# Patient Record
Sex: MALE | Race: WHITE | NOT HISPANIC OR LATINO | Employment: UNEMPLOYED | URBAN - METROPOLITAN AREA
[De-identification: names, ages, dates, MRNs, and addresses within clinical notes are randomized per-mention and may not be internally consistent; named-entity substitution may affect disease eponyms.]

---

## 2019-11-05 ENCOUNTER — HOSPITAL ENCOUNTER (INPATIENT)
Facility: CLINIC | Age: 29
LOS: 10 days | Discharge: HOME | DRG: 557 | End: 2019-11-16
Attending: FAMILY MEDICINE | Admitting: INTERNAL MEDICINE

## 2019-11-05 ENCOUNTER — APPOINTMENT (OUTPATIENT)
Dept: GENERAL RADIOLOGY | Facility: CLINIC | Age: 29
DRG: 557 | End: 2019-11-05
Attending: FAMILY MEDICINE

## 2019-11-05 DIAGNOSIS — T79.6XXA TRAUMATIC RHABDOMYOLYSIS, INITIAL ENCOUNTER (H): Primary | ICD-10-CM

## 2019-11-05 DIAGNOSIS — N17.9 ACUTE RENAL FAILURE, UNSPECIFIED ACUTE RENAL FAILURE TYPE (H): ICD-10-CM

## 2019-11-05 DIAGNOSIS — F12.90 MARIJUANA SMOKER: ICD-10-CM

## 2019-11-05 DIAGNOSIS — F15.90 OTHER STIMULANT USE, UNSPECIFIED, UNCOMPLICATED: ICD-10-CM

## 2019-11-05 LAB
ANION GAP SERPL CALCULATED.3IONS-SCNC: 11 MMOL/L (ref 3–14)
BASOPHILS # BLD AUTO: 0 10E9/L (ref 0–0.2)
BASOPHILS NFR BLD AUTO: 0.1 %
BUN SERPL-MCNC: 67 MG/DL (ref 7–30)
CALCIUM SERPL-MCNC: 8.3 MG/DL (ref 8.5–10.1)
CHLORIDE SERPL-SCNC: 94 MMOL/L (ref 94–109)
CK SERPL-CCNC: ABNORMAL U/L (ref 30–300)
CO2 SERPL-SCNC: 24 MMOL/L (ref 20–32)
CREAT SERPL-MCNC: 8.38 MG/DL (ref 0.66–1.25)
DIFFERENTIAL METHOD BLD: ABNORMAL
EOSINOPHIL # BLD AUTO: 0 10E9/L (ref 0–0.7)
EOSINOPHIL NFR BLD AUTO: 0.4 %
ERYTHROCYTE [DISTWIDTH] IN BLOOD BY AUTOMATED COUNT: 12.1 % (ref 10–15)
GFR SERPL CREATININE-BSD FRML MDRD: 8 ML/MIN/{1.73_M2}
GLUCOSE SERPL-MCNC: 93 MG/DL (ref 70–99)
HCT VFR BLD AUTO: 43 % (ref 40–53)
HGB BLD-MCNC: 15.2 G/DL (ref 13.3–17.7)
IMM GRANULOCYTES # BLD: 0.1 10E9/L (ref 0–0.4)
IMM GRANULOCYTES NFR BLD: 0.6 %
LYMPHOCYTES # BLD AUTO: 1.1 10E9/L (ref 0.8–5.3)
LYMPHOCYTES NFR BLD AUTO: 9.7 %
MCH RBC QN AUTO: 30.5 PG (ref 26.5–33)
MCHC RBC AUTO-ENTMCNC: 35.3 G/DL (ref 31.5–36.5)
MCV RBC AUTO: 86 FL (ref 78–100)
MONOCYTES # BLD AUTO: 1.1 10E9/L (ref 0–1.3)
MONOCYTES NFR BLD AUTO: 9.8 %
NEUTROPHILS # BLD AUTO: 9 10E9/L (ref 1.6–8.3)
NEUTROPHILS NFR BLD AUTO: 79.4 %
NRBC # BLD AUTO: 0 10*3/UL
NRBC BLD AUTO-RTO: 0 /100
PLATELET # BLD AUTO: 182 10E9/L (ref 150–450)
POTASSIUM SERPL-SCNC: 4.3 MMOL/L (ref 3.4–5.3)
RBC # BLD AUTO: 4.99 10E12/L (ref 4.4–5.9)
SODIUM SERPL-SCNC: 129 MMOL/L (ref 133–144)
WBC # BLD AUTO: 11.3 10E9/L (ref 4–11)

## 2019-11-05 PROCEDURE — 96360 HYDRATION IV INFUSION INIT: CPT | Performed by: FAMILY MEDICINE

## 2019-11-05 PROCEDURE — 84100 ASSAY OF PHOSPHORUS: CPT | Performed by: FAMILY MEDICINE

## 2019-11-05 PROCEDURE — 81001 URINALYSIS AUTO W/SCOPE: CPT | Performed by: FAMILY MEDICINE

## 2019-11-05 PROCEDURE — 80048 BASIC METABOLIC PNL TOTAL CA: CPT | Performed by: FAMILY MEDICINE

## 2019-11-05 PROCEDURE — 82570 ASSAY OF URINE CREATININE: CPT | Performed by: FAMILY MEDICINE

## 2019-11-05 PROCEDURE — 80320 DRUG SCREEN QUANTALCOHOLS: CPT | Performed by: FAMILY MEDICINE

## 2019-11-05 PROCEDURE — 80307 DRUG TEST PRSMV CHEM ANLYZR: CPT | Performed by: FAMILY MEDICINE

## 2019-11-05 PROCEDURE — 80076 HEPATIC FUNCTION PANEL: CPT | Performed by: FAMILY MEDICINE

## 2019-11-05 PROCEDURE — 99285 EMERGENCY DEPT VISIT HI MDM: CPT | Mod: 25 | Performed by: FAMILY MEDICINE

## 2019-11-05 PROCEDURE — 25000132 ZZH RX MED GY IP 250 OP 250 PS 637: Performed by: FAMILY MEDICINE

## 2019-11-05 PROCEDURE — 73552 X-RAY EXAM OF FEMUR 2/>: CPT | Mod: LT

## 2019-11-05 PROCEDURE — 82550 ASSAY OF CK (CPK): CPT | Performed by: FAMILY MEDICINE

## 2019-11-05 PROCEDURE — 72170 X-RAY EXAM OF PELVIS: CPT

## 2019-11-05 PROCEDURE — 85025 COMPLETE CBC W/AUTO DIFF WBC: CPT | Performed by: FAMILY MEDICINE

## 2019-11-05 PROCEDURE — 99285 EMERGENCY DEPT VISIT HI MDM: CPT | Mod: Z6 | Performed by: FAMILY MEDICINE

## 2019-11-05 PROCEDURE — 87086 URINE CULTURE/COLONY COUNT: CPT | Performed by: FAMILY MEDICINE

## 2019-11-05 PROCEDURE — 84300 ASSAY OF URINE SODIUM: CPT | Performed by: FAMILY MEDICINE

## 2019-11-05 RX ORDER — ACETAMINOPHEN 325 MG/1
975 TABLET ORAL ONCE
Status: COMPLETED | OUTPATIENT
Start: 2019-11-05 | End: 2019-11-05

## 2019-11-05 RX ORDER — SODIUM CHLORIDE 9 MG/ML
INJECTION, SOLUTION INTRAVENOUS ONCE
Status: COMPLETED | OUTPATIENT
Start: 2019-11-05 | End: 2019-11-06

## 2019-11-05 RX ADMIN — ACETAMINOPHEN 975 MG: 325 TABLET, FILM COATED ORAL at 20:41

## 2019-11-06 ENCOUNTER — APPOINTMENT (OUTPATIENT)
Dept: ULTRASOUND IMAGING | Facility: CLINIC | Age: 29
DRG: 557 | End: 2019-11-06
Attending: PHYSICIAN ASSISTANT

## 2019-11-06 ENCOUNTER — APPOINTMENT (OUTPATIENT)
Dept: GENERAL RADIOLOGY | Facility: CLINIC | Age: 29
DRG: 557 | End: 2019-11-06
Attending: INTERNAL MEDICINE

## 2019-11-06 PROBLEM — M62.82 RHABDOMYOLYSIS: Status: ACTIVE | Noted: 2019-11-06

## 2019-11-06 LAB
ALBUMIN SERPL-MCNC: 2.6 G/DL (ref 3.4–5)
ALBUMIN SERPL-MCNC: 2.9 G/DL (ref 3.4–5)
ALBUMIN UR-MCNC: 100 MG/DL
ALP SERPL-CCNC: 76 U/L (ref 40–150)
ALP SERPL-CCNC: 89 U/L (ref 40–150)
ALT SERPL W P-5'-P-CCNC: 478 U/L (ref 0–70)
ALT SERPL W P-5'-P-CCNC: 570 U/L (ref 0–70)
AMORPH CRY #/AREA URNS HPF: ABNORMAL /HPF
AMPHETAMINES UR QL SCN: POSITIVE
ANION GAP SERPL CALCULATED.3IONS-SCNC: 11 MMOL/L (ref 3–14)
ANION GAP SERPL CALCULATED.3IONS-SCNC: 13 MMOL/L (ref 3–14)
APAP SERPL-MCNC: 3 MG/L (ref 10–20)
APPEARANCE UR: ABNORMAL
AST SERPL W P-5'-P-CCNC: 1225 U/L (ref 0–45)
AST SERPL W P-5'-P-CCNC: 891 U/L (ref 0–45)
BARBITURATES UR QL: NEGATIVE
BENZODIAZ UR QL: NEGATIVE
BILIRUB DIRECT SERPL-MCNC: 0.1 MG/DL (ref 0–0.2)
BILIRUB SERPL-MCNC: 0.4 MG/DL (ref 0.2–1.3)
BILIRUB SERPL-MCNC: 0.6 MG/DL (ref 0.2–1.3)
BILIRUB UR QL STRIP: NEGATIVE
BUN SERPL-MCNC: 68 MG/DL (ref 7–30)
BUN SERPL-MCNC: 71 MG/DL (ref 7–30)
CALCIUM SERPL-MCNC: 8.2 MG/DL (ref 8.5–10.1)
CALCIUM SERPL-MCNC: 8.3 MG/DL (ref 8.5–10.1)
CANNABINOIDS UR QL SCN: POSITIVE
CHLORIDE SERPL-SCNC: 95 MMOL/L (ref 94–109)
CHLORIDE SERPL-SCNC: 96 MMOL/L (ref 94–109)
CK SERPL-CCNC: ABNORMAL U/L (ref 30–300)
CO2 SERPL-SCNC: 18 MMOL/L (ref 20–32)
CO2 SERPL-SCNC: 20 MMOL/L (ref 20–32)
COCAINE UR QL: NEGATIVE
COLOR UR AUTO: YELLOW
CREAT SERPL-MCNC: 9.31 MG/DL (ref 0.66–1.25)
CREAT SERPL-MCNC: 9.85 MG/DL (ref 0.66–1.25)
CRP SERPL-MCNC: 64 MG/L (ref 0–8)
ERYTHROCYTE [DISTWIDTH] IN BLOOD BY AUTOMATED COUNT: 12 % (ref 10–15)
ERYTHROCYTE [SEDIMENTATION RATE] IN BLOOD BY WESTERGREN METHOD: 17 MM/H (ref 0–15)
ETHANOL UR QL SCN: NEGATIVE
GFR SERPL CREATININE-BSD FRML MDRD: 6 ML/MIN/{1.73_M2}
GFR SERPL CREATININE-BSD FRML MDRD: 7 ML/MIN/{1.73_M2}
GLUCOSE SERPL-MCNC: 83 MG/DL (ref 70–99)
GLUCOSE SERPL-MCNC: 89 MG/DL (ref 70–99)
GLUCOSE UR STRIP-MCNC: 30 MG/DL
HCT VFR BLD AUTO: 41.6 % (ref 40–53)
HGB BLD-MCNC: 14.6 G/DL (ref 13.3–17.7)
HGB BLD-MCNC: 14.9 G/DL (ref 13.3–17.7)
HGB UR QL STRIP: ABNORMAL
INR PPP: 0.95 (ref 0.86–1.14)
KETONES UR STRIP-MCNC: NEGATIVE MG/DL
LACTATE BLD-SCNC: 1.3 MMOL/L (ref 0.7–2)
LEUKOCYTE ESTERASE UR QL STRIP: ABNORMAL
MCH RBC QN AUTO: 30 PG (ref 26.5–33)
MCHC RBC AUTO-ENTMCNC: 35.8 G/DL (ref 31.5–36.5)
MCV RBC AUTO: 84 FL (ref 78–100)
NITRATE UR QL: NEGATIVE
OPIATES UR QL SCN: NEGATIVE
PH UR STRIP: 5.5 PH (ref 5–7)
PHOSPHATE SERPL-MCNC: 4.6 MG/DL (ref 2.5–4.5)
PLATELET # BLD AUTO: 188 10E9/L (ref 150–450)
POTASSIUM SERPL-SCNC: 4.5 MMOL/L (ref 3.4–5.3)
POTASSIUM SERPL-SCNC: 4.6 MMOL/L (ref 3.4–5.3)
PROT SERPL-MCNC: 6.1 G/DL (ref 6.8–8.8)
PROT SERPL-MCNC: 6.5 G/DL (ref 6.8–8.8)
RBC # BLD AUTO: 4.96 10E12/L (ref 4.4–5.9)
RBC #/AREA URNS AUTO: 4 /HPF (ref 0–2)
SODIUM SERPL-SCNC: 127 MMOL/L (ref 133–144)
SODIUM SERPL-SCNC: 127 MMOL/L (ref 133–144)
SOURCE: ABNORMAL
SP GR UR STRIP: 1.01 (ref 1–1.03)
SQUAMOUS #/AREA URNS AUTO: <1 /HPF (ref 0–1)
TRANS CELLS #/AREA URNS HPF: 1 /HPF (ref 0–1)
UROBILINOGEN UR STRIP-MCNC: NORMAL MG/DL (ref 0–2)
WBC # BLD AUTO: 12.8 10E9/L (ref 4–11)
WBC #/AREA URNS AUTO: 42 /HPF (ref 0–5)
WBC CLUMPS #/AREA URNS HPF: PRESENT /HPF

## 2019-11-06 PROCEDURE — 83605 ASSAY OF LACTIC ACID: CPT | Performed by: PEDIATRICS

## 2019-11-06 PROCEDURE — 86706 HEP B SURFACE ANTIBODY: CPT | Performed by: INTERNAL MEDICINE

## 2019-11-06 PROCEDURE — 85652 RBC SED RATE AUTOMATED: CPT | Performed by: PEDIATRICS

## 2019-11-06 PROCEDURE — 25800030 ZZH RX IP 258 OP 636: Performed by: STUDENT IN AN ORGANIZED HEALTH CARE EDUCATION/TRAINING PROGRAM

## 2019-11-06 PROCEDURE — 25800030 ZZH RX IP 258 OP 636: Performed by: FAMILY MEDICINE

## 2019-11-06 PROCEDURE — 76882 US LMTD JT/FCL EVL NVASC XTR: CPT | Mod: LT

## 2019-11-06 PROCEDURE — 87340 HEPATITIS B SURFACE AG IA: CPT | Performed by: INTERNAL MEDICINE

## 2019-11-06 PROCEDURE — 02HV33Z INSERTION OF INFUSION DEVICE INTO SUPERIOR VENA CAVA, PERCUTANEOUS APPROACH: ICD-10-PCS | Performed by: INTERNAL MEDICINE

## 2019-11-06 PROCEDURE — 25800030 ZZH RX IP 258 OP 636: Performed by: INTERNAL MEDICINE

## 2019-11-06 PROCEDURE — 36415 COLL VENOUS BLD VENIPUNCTURE: CPT | Performed by: INTERNAL MEDICINE

## 2019-11-06 PROCEDURE — 82550 ASSAY OF CK (CPK): CPT | Performed by: INTERNAL MEDICINE

## 2019-11-06 PROCEDURE — 40000986 XR CHEST PORT 1 VW

## 2019-11-06 PROCEDURE — 80329 ANALGESICS NON-OPIOID 1 OR 2: CPT | Performed by: PEDIATRICS

## 2019-11-06 PROCEDURE — 25000132 ZZH RX MED GY IP 250 OP 250 PS 637: Performed by: FAMILY MEDICINE

## 2019-11-06 PROCEDURE — 85027 COMPLETE CBC AUTOMATED: CPT | Performed by: PEDIATRICS

## 2019-11-06 PROCEDURE — 90937 HEMODIALYSIS REPEATED EVAL: CPT

## 2019-11-06 PROCEDURE — 80048 BASIC METABOLIC PNL TOTAL CA: CPT | Performed by: INTERNAL MEDICINE

## 2019-11-06 PROCEDURE — 25000128 H RX IP 250 OP 636: Performed by: STUDENT IN AN ORGANIZED HEALTH CARE EDUCATION/TRAINING PROGRAM

## 2019-11-06 PROCEDURE — 86140 C-REACTIVE PROTEIN: CPT | Performed by: PEDIATRICS

## 2019-11-06 PROCEDURE — 5A1D70Z PERFORMANCE OF URINARY FILTRATION, INTERMITTENT, LESS THAN 6 HOURS PER DAY: ICD-10-PCS | Performed by: INTERNAL MEDICINE

## 2019-11-06 PROCEDURE — 25000125 ZZHC RX 250: Performed by: STUDENT IN AN ORGANIZED HEALTH CARE EDUCATION/TRAINING PROGRAM

## 2019-11-06 PROCEDURE — 36415 COLL VENOUS BLD VENIPUNCTURE: CPT | Performed by: PEDIATRICS

## 2019-11-06 PROCEDURE — 12000001 ZZH R&B MED SURG/OB UMMC

## 2019-11-06 PROCEDURE — 85610 PROTHROMBIN TIME: CPT | Performed by: PEDIATRICS

## 2019-11-06 PROCEDURE — 99223 1ST HOSP IP/OBS HIGH 75: CPT | Mod: AI | Performed by: INTERNAL MEDICINE

## 2019-11-06 PROCEDURE — 93971 EXTREMITY STUDY: CPT | Mod: LT

## 2019-11-06 PROCEDURE — 25000132 ZZH RX MED GY IP 250 OP 250 PS 637: Performed by: EMERGENCY MEDICINE

## 2019-11-06 PROCEDURE — 80053 COMPREHEN METABOLIC PANEL: CPT | Performed by: PEDIATRICS

## 2019-11-06 PROCEDURE — 85018 HEMOGLOBIN: CPT | Performed by: INTERNAL MEDICINE

## 2019-11-06 RX ORDER — OXYCODONE HYDROCHLORIDE 5 MG/1
5 TABLET ORAL ONCE
Status: COMPLETED | OUTPATIENT
Start: 2019-11-06 | End: 2019-11-06

## 2019-11-06 RX ORDER — NALOXONE HYDROCHLORIDE 0.4 MG/ML
.1-.4 INJECTION, SOLUTION INTRAMUSCULAR; INTRAVENOUS; SUBCUTANEOUS
Status: DISCONTINUED | OUTPATIENT
Start: 2019-11-06 | End: 2019-11-16 | Stop reason: HOSPADM

## 2019-11-06 RX ORDER — SODIUM CHLORIDE, SODIUM LACTATE, POTASSIUM CHLORIDE, CALCIUM CHLORIDE 600; 310; 30; 20 MG/100ML; MG/100ML; MG/100ML; MG/100ML
INJECTION, SOLUTION INTRAVENOUS CONTINUOUS
Status: DISCONTINUED | OUTPATIENT
Start: 2019-11-06 | End: 2019-11-06

## 2019-11-06 RX ORDER — HYDROMORPHONE HYDROCHLORIDE 1 MG/ML
0.5 INJECTION, SOLUTION INTRAMUSCULAR; INTRAVENOUS; SUBCUTANEOUS EVERY 4 HOURS PRN
Status: DISCONTINUED | OUTPATIENT
Start: 2019-11-06 | End: 2019-11-10

## 2019-11-06 RX ORDER — ACETAMINOPHEN 500 MG
1000 TABLET ORAL ONCE
Status: DISCONTINUED | OUTPATIENT
Start: 2019-11-06 | End: 2019-11-06

## 2019-11-06 RX ORDER — SODIUM CHLORIDE, SODIUM LACTATE, POTASSIUM CHLORIDE, CALCIUM CHLORIDE 600; 310; 30; 20 MG/100ML; MG/100ML; MG/100ML; MG/100ML
INJECTION, SOLUTION INTRAVENOUS CONTINUOUS
Status: ACTIVE | OUTPATIENT
Start: 2019-11-06 | End: 2019-11-07

## 2019-11-06 RX ORDER — LIDOCAINE 40 MG/G
CREAM TOPICAL
Status: DISCONTINUED | OUTPATIENT
Start: 2019-11-06 | End: 2019-11-16 | Stop reason: HOSPADM

## 2019-11-06 RX ADMIN — NICOTINE POLACRILEX 4 MG: 4 GUM, CHEWING BUCCAL at 01:19

## 2019-11-06 RX ADMIN — SODIUM CHLORIDE: 9 INJECTION, SOLUTION INTRAVENOUS at 00:03

## 2019-11-06 RX ADMIN — SODIUM CHLORIDE, POTASSIUM CHLORIDE, SODIUM LACTATE AND CALCIUM CHLORIDE 1000 ML: 600; 310; 30; 20 INJECTION, SOLUTION INTRAVENOUS at 05:51

## 2019-11-06 RX ADMIN — OXYCODONE HYDROCHLORIDE 5 MG: 5 TABLET ORAL at 02:55

## 2019-11-06 RX ADMIN — HYDROMORPHONE HYDROCHLORIDE 0.5 MG: 1 INJECTION, SOLUTION INTRAMUSCULAR; INTRAVENOUS; SUBCUTANEOUS at 08:25

## 2019-11-06 RX ADMIN — SODIUM CHLORIDE, POTASSIUM CHLORIDE, SODIUM LACTATE AND CALCIUM CHLORIDE: 600; 310; 30; 20 INJECTION, SOLUTION INTRAVENOUS at 12:39

## 2019-11-06 RX ADMIN — SODIUM CHLORIDE 250 ML: 9 INJECTION, SOLUTION INTRAVENOUS at 19:51

## 2019-11-06 RX ADMIN — HYDROMORPHONE HYDROCHLORIDE 0.5 MG: 1 INJECTION, SOLUTION INTRAMUSCULAR; INTRAVENOUS; SUBCUTANEOUS at 14:16

## 2019-11-06 RX ADMIN — LIDOCAINE HYDROCHLORIDE 1 ML: 10 INJECTION, SOLUTION EPIDURAL; INFILTRATION; INTRACAUDAL; PERINEURAL at 17:22

## 2019-11-06 RX ADMIN — Medication: at 19:51

## 2019-11-06 RX ADMIN — SODIUM CHLORIDE 300 ML: 9 INJECTION, SOLUTION INTRAVENOUS at 19:50

## 2019-11-06 ASSESSMENT — ENCOUNTER SYMPTOMS
NUMBNESS: 1
NAUSEA: 0
VOMITING: 0
COUGH: 0
HEMATOLOGIC/LYMPHATIC NEGATIVE: 1
CHILLS: 0
FEVER: 0
SHORTNESS OF BREATH: 0
CONFUSION: 0
DYSURIA: 0
MYALGIAS: 0
PALPITATIONS: 0
AGITATION: 0

## 2019-11-06 ASSESSMENT — PAIN DESCRIPTION - DESCRIPTORS
DESCRIPTORS: INTERMITTENT;SORE
DESCRIPTORS: CONSTANT;SORE

## 2019-11-06 ASSESSMENT — MIFFLIN-ST. JEOR: SCORE: 2063.96

## 2019-11-06 ASSESSMENT — ACTIVITIES OF DAILY LIVING (ADL)
ADLS_ACUITY_SCORE: 13
ADLS_ACUITY_SCORE: 15

## 2019-11-06 NOTE — PLAN OF CARE
Patient A & O X 4, VSS no respiratory distress noted, c/o left hip and leg , received Dilaudid IV X 2 PRN. Post assessment with some relief/ patient report when he laying pain tolerable , with activity pain wars. Patient remain NPO , IVF infusing at 75cc/hr. Patient void x 2 report forget to save urine. Patient on strict I/O. Call light in reach , continue monitor closely and update MD with concerns.

## 2019-11-06 NOTE — CONSULTS
"[facility]  ORTHOPAEDIC SURGERY CONSULT - HISTORY AND PHYSICAL    DATE OF CONSULT: 11/6/2019   REQUESTING PROVIDER: Porfirio Morales MD, MD    TIME OF CONSULT: 0808  TIME OF PATIENT EVALUATION: 0830    CC: left buttock pain  DATE OF INJURY: 11/3/2019      HISTORY OF PRESENT ILLNESS:   Sawyer Walters is a 29 year old male with PMH including polysubstance abuse, admitted on 11/6/2019 who presented initially for ERROL likely due to rhabdomyolysis.  Orthopedics was consulted to evaluate the patient's complaint of left and thigh pain.  Patient reports that the pain began approximately 3 days ago on Sunday morning.  Prior to the start of his symptoms, the patient reports that he \"blacked out\" on his left side for approximately 12 hours after a night of drinking.  Initially, he reports the pain was excruciating, 10/10 throbbing pain of the left buttock that inhibited his ability to comfortably ambulate. Over the last 3 days, he reports a generalized improvement in his symptoms of pain, and he has been able to ambulate on the left lower extremity with a \"limp.\"  He denies experiencing similar symptoms in the past.  At baseline, the patient ambulates comfortably without the assistance of any devices.    Otherwise, the patient reports that he was feeling well prior to presentation in the emergency department.  Specifically, he reports the following:   General: denies fever, chills, sweats, fatigue, recent changes in weight or appetite  Integument: denies rashes, sores, lumps/bumps not otherwise noted above  Respiratory: denies cough, sputum production, hemoptysis, dyspnea, and wheezing  Cardiac: denies chest pain, SOB, palpitations  GI: denies abdominal pain, nausea, vomiting, change in bowel habits - diarrhea or constipation  Peripheral Vascular: denies history of DVT, peripheral edema  Musculoskeletal:  denies joint pain, swollen or stiff joints not otherwise noted above  Neurological: denies fainting, " focal weakness or paralysis, numbness/loss of sensation, tingling/altered sensation, tremors or other involuntary movements not otherwise noted above  Hematological: denies blood abnormalities other than those noted above    History obtained from patient interview and chart review. All relevant notes were reviewed and HPI/pertinent ROS were updated to reflect their current presentation and hospital course.       PAST MEDICAL HISTORY:   History reviewed. No pertinent past medical history.    Patient denies any personal history of bleeding disorders, clotting disorders, or adverse reactions to anesthesia.      PAST SURGICAL HISTORY:   No past surgical history on file.      MEDICATIONS:   Prior to Admission medications    Not on File         ALLERGIES:   Patient has no known allergies.      SOCIAL HISTORY:   Patient is a 29-year-old male, living situation unclear.  He presents to the hospital with his girlfriend.  He does have a significant history of polysubstance abuse, most recent UDS includes positive cannabinoids and amphetamine.  He endorses occasional tobacco use.  He drinks alcohol socially.      FAMILY HISTORY:  Patient denies known family history of bleeding, clotting, or anesthesia related complications.     REVIEW OF SYSTEMS:   A brief 10-point ROS was performed during the patient encounter. All pertinent items are included in the HPI. Other systems were negative, as below:    Head:  denies new headache, dizziness, light headedness  Eyes: denies new changes in vision, blurred vision, diplopia, excessive tearing  Ears: denies new hearing loss, pain  Nose: denies recent nasal congestion   Mouth: denies new oral sores, ulcers  Throat:  denies new pain, hoarseness, difficulty swallowing  Urinary: denies new onset dysuria, hematuria, polyuria, nocturia         PHYSICAL EXAM:   Vitals:    11/06/19 0300 11/06/19 0400 11/06/19 0449 11/06/19 1018   BP: 116/87 (!) 143/88 (!) 149/93 139/85   BP Location:    Left arm    Pulse: 73 71 72 60   Resp:   17 16   Temp:  97.9  F (36.6  C) 95.7  F (35.4  C) 96.9  F (36.1  C)   TempSrc:   Oral Oral   SpO2:   99% 99%   Weight:         General: Patient is somnolent at time of presentation, sleeping comfortably in bed.  He is easily aroused with verbal stimulation.  Answers questions appropriately, follows commands; overall, not acutely distressed except with palpation of left buttocks and thigh   Neuro: CN II-XII grossly intact  Skin: No rashes, lumps or bumps; skin color normal; multiple tattoos on neck and upper extremities bilaterally  HEENT:  Normocephalic, atraumatic; EOMs grossly intact; external ear without erythema or edema bilaterally; no septal deviation  Lungs: Breathing comfortably and nonlabored, no wheezes or stridor noted  Heart/Cardiovascular: Regular pulse, no peripheral cyanosis  Abdomen: Soft, non-tender, non-distended   Musculoskeletal:   Pelvis: Stable to AP and lateral compression  Upper Extremity:     No deformity, skin intact.     No significant tenderness to palpation over clavicle, AC joint, shoulder, arm, elbow, forearm, wrist.     Normal ROM of shoulder, elbow, and wrist, without pain    Motor intact distally throughout the radial, ulnar, and median nerve distributions as indicated by intact, 5/5 strength with thumbs up, finger crossing, and OK sign    Neurologic: SILT ax/m/r/u nerve distributions.     Vascular: Radial pulse palpable, 2+.   Right Lower Extremity:     No deformity, skin intact.     No significant tenderness to palpation over thigh, knee, leg, ankle/foot.     Normal ROM of hip, knee, and ankle, without tenderness    Motor intact distally throughout the tibial and peroneal nerve distributions as indicated by intact 5/5 strength with TA/GSC/EHL/FHL firing    SILT sp/dp/tibial/saph/sural nerves    DP/PT pulses palpable, 2+, toes warm and well perfused  Left Lower Extremity:     Palpable mass over the anterior thigh extending to the lateral buttock over  the greater trochanter area; the area is moderately soft, compressible, and elicits tenderness only with deep palpation; there is no significant here edema overlying the area of tenderness    Moderately tender to palpation over gluteal compartment; no significant tenderness to palpation over anterior, posterior, and adductor compartments or knee, leg, ankle/foot.     Normal ROM of hip (flexion/extension, abduction/adduction), knee, and ankle, with minimal tenderness    Motor intact distally throughout the tibial and peroneal nerve distributions as indicated by intact 5/5 strength with TA/GSC/EHL/FHL firing    SILT sp/dp/tibial/saph/sural nerves    DP/PT pulses palpable, 2+, toes warm and well perfused      LABS:  CBC  Hemoglobin   Date Value Ref Range Status   11/06/2019 14.9 13.3 - 17.7 g/dL Final     WBC   Date Value Ref Range Status   11/06/2019 12.8 (H) 4.0 - 11.0 10e9/L Final     Hematocrit   Date Value Ref Range Status   11/06/2019 41.6 40.0 - 53.0 % Final     Platelet Count   Date Value Ref Range Status   11/06/2019 188 150 - 450 10e9/L Final       CREATININE  Creatinine   Date Value Ref Range Status   11/06/2019 9.31 (H) 0.66 - 1.25 mg/dL Final       GLUCOSE  Glucose   Date Value Ref Range Status   11/06/2019 89 70 - 99 mg/dL Final       INR  INR   Date Value Ref Range Status   11/06/2019 0.95 0.86 - 1.14 Final       INFLAMMATORY LABS  WBC   Date Value Ref Range Status   11/06/2019 12.8 (H) 4.0 - 11.0 10e9/L Final     CRP Inflammation   Date Value Ref Range Status   11/06/2019 64.0 (H) 0.0 - 8.0 mg/L Final     Sed Rate   Date Value Ref Range Status   11/06/2019 17 (H) 0 - 15 mm/h Final         IMAGING:  AP lateral imaging of the left hip demonstrates no acute fracture, malalignment, or any other osseous abnormalities.       ASSESSMENT AND PLAN:    Sawyer Walters is a 29 year old male who presents with left buttock pain and anterior thigh pain consistent with hematoma versus myositis. Possible evolved  "compartment syndrome.    The patient presents with a history of 3 days of left buttock/anterior thigh pain that occurred after \"blacking out\" on the left side.  Patient reports that his pain was substantial on Sunday but has slightly improved since admission to the hospital.  In the context of an elevated CK 44,000 (now down to 18,000) his complaints are concerning for the possibility of an evolved compartment syndrome.  An acute/developing compartment syndrome is not supported by exam given that the patient is very comfortable and only responds to deep palpation of the left gluteal compartment.  Also, the compartments are moderately soft, not firm.  Although I do not recommend Orient compartment pressure monitoring at this time, it would be reasonable to perform serial checks over the next 12 hours to ensure that the patient's symptoms have not changed and he does not develop any other signs or symptoms consistent with compartment syndrome.  Furthermore, the patient should be kept n.p.o. until serial checks by orthopedics have been completed.    Given that the patient's CRP, sed rate, and white blood cell count are elevated, diagnosis of myositis may be considered. Also, given the mechanism of injury, his symptoms may be explained by development of a hematoma.    At this juncture, orthopedics will continue to monitor.  Recommend ultrasound to assess for DVT, and ultrasound imaging of the left gluteal region to assess for underlying mass (ie assessment of hematoma). No further imaging is indicated at this time.    The patient may participate in activities as tolerated.  He may participate in hip range of motion as tolerated, and he may weight-bear as tolerated.    Assessment and Plan discussed with Eder Frausto, PGY-4      Jamarcus Awad PA-C  Orthopaedic Surgery  Pager: [phone]     Thank you for allowing me to participate in this patient's care. Please page me at [phone] with any questions/concerns. If " there is no response, if it is a weekend, or if it is during evening hours, please page the orthopaedic surgery resident on call.

## 2019-11-06 NOTE — SUMMARY OF CARE
Pt arrived to unit around 0445 w/belongings including cell phone/ & clothing. Pt arrived with girlfriend @ bedside.

## 2019-11-06 NOTE — CONSULTS
[facility] PATIENT CONSULTATION NOTE    HPI:  Sawyer Walters is a 29 year old male with history of polysubstance abuse who presented to the ED on 11/5/2019 with complaints of left leg pain and right arm numbness.  Patient reports he got drunk at a hotel on Saturday (11/2/19) night and blacked out for 12 hours, sleeping on his side on the floor.  Patient reports difficultly walking due to his leg pain.  Patient currently reporting severe left hip pain which is similar in severity when he presented to the ER last night.  Denies any history of DVTs or clotting disorder.   Vascular surgery was consulted for left hip pain after being down with rhabdo, left hip swollen, tense and painful, concern for compartment syndrome.       PAST MEDICAL HISTORY:  History reviewed. No pertinent past medical history.    PAST SURGICAL HISTORY:  No past surgical history on file.    FAMILY HISTORY:  No family history on file.    SOCIAL HISTORY:   Social History     Tobacco Use     Smoking status: Not on file   Substance Use Topics     Alcohol use: Not on file       TOBACCO USE:  Current daily smoker     FUNCTIONAL CAPACITY: unemployed     HOME MEDICATIONS:  Prior to Admission medications    Not on File       VITAL SIGNS:  Temp: 95.7  F (35.4  C) Temp src: Oral BP: (!) 149/93 Pulse: 72   Resp: 17 SpO2: 99 % O2 Device: None (Room air)      215 lbs 0 oz    PHYSICAL EXAM:  NEURO/PSYCH:  The patient is alert and oriented.  Appropriate.  Moves all extremities.    SKIN:  Warm and dry.  PULMONARY: unlabored breathing, not requiring supplemental oxygen    EXTREMITIES: palpable bilateral DP and PT, palpable left femoral pulse, left lower extremity compartments soft, feet warm and well perfused, left gluteal region tender to touch, no lower extremity edema        BMP  Recent Labs   Lab 11/06/19  0751 11/05/19  2140   * 129*   POTASSIUM 4.5 4.3   CHLORIDE 95 94   CO2 20 24   BUN 71* 67*   CR 9.31* 8.38*   GLC 89 93   PHOS  --  4.6*      CBC  Recent Labs   Lab 11/06/19  0751 11/05/19  2140   WBC 12.8* 11.3*   HGB 14.9 15.2    182     INR/PTT  Recent Labs   Lab 11/06/19  0751   INR 0.95     ABGNo lab results found in last 7 days.  LFT  Recent Labs   Lab 11/06/19  0751 11/05/19  2140   * 1,225*   * 570*   ALKPHOS 76 89   BILITOTAL 0.6 0.4   ALBUMIN 2.6* 2.9*     PancreasNo lab results found in last 7 days.        ASSESSMENT:  29 year old man with a history of polysubstance abuse, admitted on 11/5/2019, who presents with ERROL likely due to rhabdomyolysis.       Patient Active Problem List   Diagnosis     Rhabdomyolysis           PLAN:  - consult orthopedics to evaluate left gluteal compartments to determine if patient requires fasciotomy   - no urgent vascular surgery intervention warranted at this time       Vascular surgery will sign off, please call with any questions.           Shayy TREVINO, CNS  Division of Vascular Surgery  [facility]    Pager [phone]  Office [phone]

## 2019-11-06 NOTE — H&P
"    [facility] [place]    History and Physical - Night Float Service        Date of Admission:  11/5/2019    Assessment & Plan   Sawyer Walters is a 29 year old man with a history of polysubstance abuse, admitted on 11/6/2019, who presents with ERROL likely due to rhabdomyolysis.    Acute Renal Failure   Transaminitis  Rhabdomyolysis  Hyponatremia  Hyperphosphatemia  Patient reports making yellow urine. BUN 67 and Cr 8.38. Na 129 (No prior sodiums available to compare). CK total - 59957, UA with moderate blood and negative RBC, WBC elevated, nitrites negative and crystals on microscopy suggesting AIN from rhabdomyolysis. No anion gap and bicarb 24. Given reported history from ED, rhabdomyolysis likely due to immobility, although physical trauma cannot be excluded. Other metabolic disturbances also may be contributing.  - 1L LR over 1 hour and continuous infusion at 200 mL/hr  - Tylenol for additional contributing metabolic disturbances  - INR to assess liver synthetic function  - Tylenol level  - CK total in AM  - CMP in AM  - Nephrology consulted    Mechanical Fall  Acute Left Hip Pain  Full range of active and passive motion. Hip/pelvis X-ray negative for fracture. Likely soft tissue injury  - Continue to monitor. Will avoid tylenol, NSAIDs, and opioids overnight.    Polysubstance Abuse  Patient reports \"blacking out\" and doesn't recall recent event. He does admit to drug use, but doesn't wish to elaborate. UDS positive for methamphetamines and cannaboids. Patient in pre-contemplative phase of cessation as he reports being embarrassed to talk about his substance abuse.       Diet: Regular adult diet  Fluids: 1L of LR with  mL/hr  DVT Prophylaxis: Low Risk/Ambulatory with no VTE prophylaxis indicated  Jaramilol Catheter: not present  Code Status: Full Code    Disposition Plan   Expected discharge: 2 - 3 days, recommended to prior living arrangement once renal function " "improved.  Entered: Won Jewell MD 11/06/2019, 5:14 AM       The patient's care was discussed with the Attending Physician, Dr. Loren Moseley MD.    Won Jewell MD  Night Float Service  [facility], [place]  Pager: [phone], See sticky note for AM  Please see sticky note for cross cover information  ___________________________________________________________________    Chief Complaint     History is obtained from the patient and chart review    History of Present Illness   Sawyer Walters is a 29 year old man with a history of polysubstance abuse who presents with ERROL likely due to rhabdomyolysis. Patient reports \"blacking out\" and not remembering recent events. He is able to say that he was at a hotel and that he had been drinking alcohol. He reports other drug use, but is not willing to disclose what substances. He complains of acute left hip pain and \"limping\". He reports that he is making urine and has peed three times today. He states his urine is yellow and denies hematuria.    He denies chest pain, SOB, nausea, vomiting, fever, abdominal pain, back pain.    Review of Systems    CONSTITUTIONAL: NEGATIVE for fever, chills  INTEGUMENTARY/SKIN: NEGATIVE for worrisome rashes, moles or lesions  ENT/MOUTH: NEGATIVE for ear, mouth and throat problems  RESP: NEGATIVE for significant cough or SOB  CV: NEGATIVE for chest pain or peripheral edema  GI: NEGATIVE for nausea, abdominal pain,or change in bowel habits  : NEGATIVE for hematuria, anuria  MUSCULOSKELETAL: POSITIVE for arthralgia and myalgia  PSYCH: POSITIVE for substance abuse.    Past Medical History    I have reviewed this patient's medical history and updated it with pertinent information if needed. Patient denies past medical disease.    Past Surgical History   Past surgical history reviewed with no previous surgeries identified.    Social History   Patient reports blacking out from acute alcohol " intoxication and reports other substance abuse which he wishes not to disclose    Family History   Family history reviewed with patient and is noncontributory.    Prior to Admission Medications   None     Allergies   No Known Allergies    Physical Exam   Vital Signs: Temp: 95.7  F (35.4  C) Temp src: Oral BP: (!) 149/93 Pulse: 72   Resp: 17 SpO2: 99 % O2 Device: None (Room air)    Weight: 215 lbs 0 oz    General Appearance: A&Ox4; comfortable in bed with significant other.  Eyes: No scleral icterus, EOMI  HEENT: No thrush, no cervical lymphadenopathy  Respiratory: CTAB bilaterally  Cardiovascular: S1 S2 RRR no m/g/r.   GI: Flat, +BS, NTTP  Lymph/Hematologic:   Genitourinary: No CVA tenderness. No asterixis  Skin: Tender, warm irregular erythematous lesion on left hip  Extremities: Pulses 2+ bilaterally, no peripheral edema. No tender  Neurologic: 5/5 muscle strength in the lower extremities. CN II- XII intact    Data   Data reviewed today: I reviewed all medications, new labs and imaging results over the last 24 hours. I personally reviewed the Left hip/pelvis image(s) showing no acute fracture.    Recent Labs   Lab 11/05/19  2140   WBC 11.3*   HGB 15.2   MCV 86      *   POTASSIUM 4.3   CHLORIDE 94   CO2 24   BUN 67*   CR 8.38*   ANIONGAP 11   JOSTIN 8.3*   GLC 93   ALBUMIN 2.9*   PROTTOTAL 6.5*   BILITOTAL 0.4   ALKPHOS 89   *   AST 1,225*     CK - 44,338  UA - moderate blood without RBC, crystals on microscopy    Physician Attestation   I, Roosevelt Sharma MD, saw this patient with the resident and agree with the resident/fellow's findings and plan of care as documented in the note.      I personally reviewed vital signs, medications, labs and imaging.      Roosevelt Sharma MD  Date of Service (when I saw the patient): 11/6/19

## 2019-11-06 NOTE — ED PROVIDER NOTES
Leaving shift. Report given to Dr Tolentino. 3 way conversation with int med and bed control well over 1 1/2 hours ago.     Uziel Otto MD  11/06/19 0156

## 2019-11-06 NOTE — ED NOTES
"[facility]   ED Nurse to Floor Handoff     Sawyer Walters is a 29 year old male who speaks English and lives with others,  home status is unknown  They arrived in the ED by walking from home    ED Chief Complaint: Leg Pain (per pt he passed out at home 3 days ago after drinking, fell down and since then has been having bilateral leg pain but more on the L side, per pt he still can't walk \" I feel a bump on my L upper thigh\" Is also having numbness and tingling on the R hand)    ED Dx;   Final diagnoses:   Acute renal failure, unspecified acute renal failure type (H)         Needed?: No    Allergies: No Known Allergies.  Past Medical Hx: History reviewed. No pertinent past medical history.   Baseline Mental status: WDL  Current Mental Status changes: at basesline    Infection present or suspected this encounter: no  Sepsis suspected: No  Isolation type: No active isolations     Activity level - Baseline/Home:  Independent  Activity Level - Current:   Independent    Bariatric equipment needed?: No    In the ED these meds were given:   Medications   sodium chloride 0.9% infusion (has no administration in time range)   acetaminophen (TYLENOL) tablet 975 mg (975 mg Oral Given 11/5/19 2041)       Drips running?  No    Home pump  No    Current LDAs  Peripheral IV 11/05/19 Right (Active)   Site Assessment Other (Comment) 11/6/2019 12:47 AM   Number of days: 1       Labs results:   Labs Ordered and Resulted from Time of ED Arrival Up to the Time of Departure from the ED   ROUTINE UA WITH MICROSCOPIC REFLEX TO CULTURE - Abnormal; Notable for the following components:       Result Value    Glucose Urine 30 (*)     Blood Urine Moderate (*)     Protein Albumin Urine 100 (*)     Leukocyte Esterase Urine Moderate (*)     WBC Urine 42 (*)     RBC Urine 4 (*)     WBC Clumps Present (*)     Amorphous Crystals Few (*)     All other components within normal limits   DRUG ABUSE SCREEN " 6 CHEM DEP URINE ([facility]) - Abnormal; Notable for the following components:    Amphetamine Qual Urine Positive (*)     Cannabinoids Qual Urine Positive (*)     All other components within normal limits   CBC WITH PLATELETS DIFFERENTIAL - Abnormal; Notable for the following components:    WBC 11.3 (*)     Absolute Neutrophil 9.0 (*)     All other components within normal limits   CK TOTAL - Abnormal; Notable for the following components:    CK Total 44,338 (*)     All other components within normal limits   BASIC METABOLIC PANEL - Abnormal; Notable for the following components:    Sodium 129 (*)     Urea Nitrogen 67 (*)     Creatinine 8.38 (*)     GFR Estimate 8 (*)     GFR Estimate If Black 9 (*)     Calcium 8.3 (*)     All other components within normal limits   HEPATIC PANEL - Abnormal; Notable for the following components:    Albumin 2.9 (*)     Protein Total 6.5 (*)      (*)     AST 1,225 (*)     All other components within normal limits   PHOSPHORUS - Abnormal; Notable for the following components:    Phosphorus 4.6 (*)     All other components within normal limits   URINE CULTURE AEROBIC BACTERIAL       Imaging Studies:   Recent Results (from the past 24 hour(s))   XR Pelvis 1/2 Views    Narrative    PELVIS AND LEFT FEMUR FIVE VIEWS   11/5/2019 10:29 PM     HISTORY: Fall 4 days ago while intoxicated. Pain and swelling over the  left lateral pelvis/upper leg. Painful to walk.    COMPARISON: None.      Impression    IMPRESSION: No visualized acute fracture, malalignment or other acute  osseous abnormality of the pelvis or left femur.    DOMENIC RADFORD MD   XR Femur Left 2 Views    Narrative    PELVIS AND LEFT FEMUR FIVE VIEWS   11/5/2019 10:29 PM     HISTORY: Fall 4 days ago while intoxicated. Pain and swelling over the  left lateral pelvis/upper leg. Painful to walk.    COMPARISON: None.      Impression    IMPRESSION: No visualized acute fracture, malalignment or other acute  osseous abnormality of the  pelvis or left femur.    DOMENIC RADFORD MD       Recent vital signs:   BP (!) 150/104   Pulse 73   Temp 96.4  F (35.8  C) (Oral)   Resp 16   Wt 97.5 kg (215 lb)   SpO2 100%     Wayne Coma Scale Score: 15 (11/05/19 2033)       Cardiac Rhythm: Tachycardia  Pt needs tele? No  Skin/wound Issues:R hip swelling    Code Status: Full Code    Pain control: fair    Nausea control: pt had none    Abnormal labs/tests/findings requiring intervention: Elevated CK, Elevated Creat, Elevated LFTs    Family present during ED course? No   Family Comments/Social Situation comments: PT GF visiting, then left    Tasks needing completion: None    JESSICA HERNANDEZ, RN  [facility] -- [phone] [place] [facility]  [phone] [facility] ED

## 2019-11-06 NOTE — PROGRESS NOTES
[facility] Internal Medicine Progress  Note            Interval History:   ESTRELLITA, team notes reviewed   Slept well   Left hip very painful to light palpation   Appreciative of cares          Assessment and Plan:   28 y/o M w/ERROL/AIN d/t rhabdomyolysis following 'black-out' d/t polysubstance abuse w/new left hip pain which started Sunday morning    # RHABDOMYOLYSIS w/AIN   # HYPONATREMIA   # UREMIA   # HYPERPHOSPHATEMIA   # ANEPHRIC ERROL/RENAL FAILURE   # TRANSAMINITIS   CK 44K w/anephric creatinine rise concerning for AIN induced renal failure. Per nephrology, gentle fluids w/caution for volume overload. Will continue to assess need for iHD. LFTs improving.   - Nephrology following, evaluating for iHD needs   - repeat CK x1   - monitor BMP/lytes   - LR@75cc/hr   - Strict I/O      # MECHANICAL FALL   # ACUTE LEFT HIP PAIN   No fx on imaging. Concern for compartment syndrome. Evaluated by vascular and orthopedic surgery.   - Dilauded 0.5mg q4hrs PRN      # LEUKOCYTOSIS   No evidence of infection. Likely reactive   - CTM      # POLYSUBSTANCE ABUSE   Tox screen + Amphetamines, Cannabinoids. Patient refusing to talk about it.   - consider CD consult tomorrow if pt willing       Diet: Regular   Fluids: LR@ 75cc/hr  DVT: SCDs, caution w/anticoagulation given uremia/possibe hip hematoma   Code Status: Full      This patient was staffed with Dr. Sharma, the attending physician on service.     Aldair Solano IV, MD, MSc  Internal Medicine Resident, PGY3  [facility]   Pager [phone]            Objective:   /85 (BP Location: Left arm)   Pulse 60   Temp 96.9  F (36.1  C) (Oral)   Resp 16   Wt 97.5 kg (215 lb)   SpO2 99%     No intake or output data in the 24 hours ending 11/06/19 1314    PHYSICAL EXAMINATION   GEN: A&Ox3, in pain w/movement, pleasant, cooperative   CV: RRR - no m/r/g  LUNGS: CTAB, nwb  ABD: +BS, soft, NT/ND  EXT: wwp, no edema, left hip tense/painful, no sxs of hematoma   NEURO: no  FNDs      Labs, Imaging, & Medications:   All labs, images, and medications reviewed by me.

## 2019-11-06 NOTE — ED PROVIDER NOTES
"    [facility] EMERGENCY DEPARTMENT ([facility])     November 5, 2019    History     Chief Complaint   Patient presents with     Leg Pain     per pt he passed out at home 3 days ago after drinking, fell down and since then has been having bilateral leg pain but more on the L side, per pt he still can't walk \" I feel a bump on my L upper thigh\" Is also having numbness and tingling on the R hand     HPI  Sawyer Walters is a 29 year old male who presents to the ED for evaluation of L leg pain and right arm numbness. Patient reports he got drunk at a hotel on Saturday (11/2/19) night, blacked out, and ended up on the floor. He is unsure if he fell from standing or how he got onto the floor. He states he slept on his right side for 12 hours with his right arm folded underneath his body, and his right hand has been numb since waking up after that incident. Patient reports he spent most of the day in bed yesterday. He states he tried to walk yesterday, however, he has been unable to walk, because his \"legs don't work.\" He complains of a \"bump\" on his left upper thigh. Patient reports he does not usually drink a lot. He states he is otherwise healthy. He notes he has been unemployed for \"awhile.\"  He denies street drugs.    PAST MEDICAL HISTORY  History reviewed. No pertinent past medical history.  PAST SURGICAL HISTORY  No past surgical history on file.  FAMILY HISTORY  No family history on file.  SOCIAL HISTORY  Social History     Tobacco Use     Smoking status: Not on file   Substance Use Topics     Alcohol use: Not on file     MEDICATIONS  Current Facility-Administered Medications   Medication     sodium chloride 0.9% infusion     No current outpatient medications on file.     ALLERGIES  No Known Allergies    I have reviewed the Medications, Allergies, Past Medical and Surgical History, and Social History in the Epic system.    Review of Systems   Constitutional: Negative for chills and fever.   HENT: Negative " "for congestion.    Respiratory: Negative for cough and shortness of breath.    Cardiovascular: Negative for chest pain, palpitations and leg swelling.   Gastrointestinal: Negative for nausea and vomiting.   Genitourinary: Negative for dysuria.   Musculoskeletal: Negative for myalgias.        Positive for leg pain.   Skin: Negative.         Positive for \"bump\" on left upper thigh.   Allergic/Immunologic: Negative for immunocompromised state.   Neurological: Positive for numbness (glove distribution to the right hand).   Hematological: Negative.    Psychiatric/Behavioral: Negative for agitation and confusion.   All other systems reviewed and are negative.      Physical Exam   BP: (!) 144/86  Pulse: 80  Temp: 96.4  F (35.8  C)  Resp: 16  Weight: 97.5 kg (215 lb)  SpO2: 99 %      Physical Exam  Vitals signs and nursing note reviewed.   Constitutional:       General: He is not in acute distress.     Appearance: Normal appearance. He is normal weight. He is not ill-appearing, toxic-appearing or diaphoretic.   HENT:      Head: Normocephalic and atraumatic.   Neck:      Musculoskeletal: Normal range of motion and neck supple.   Cardiovascular:      Rate and Rhythm: Normal rate and regular rhythm.      Pulses: Normal pulses.      Heart sounds: Normal heart sounds.   Pulmonary:      Effort: Pulmonary effort is normal.      Breath sounds: Normal breath sounds.   Abdominal:      Palpations: Abdomen is soft. There is no mass.   Musculoskeletal:      Comments: There is a firm area over the left lateral pelvis and upper leg  The area is tender to the touch- no real bruising  CMS to the left foot is intact   Neurological:      General: No focal deficit present.      Mental Status: He is alert and oriented to person, place, and time.      Comments: Exam of the right hand shows no edema  Sensation is present  Strength to fingers and wrist and arm is equal and symetrical         ED Course        Procedures        xrays show no acute " fractures- indeed the pt is able to walk and stand.  The pt's labs show renal failure with no acidosis and normal K.  The CK is 44,000!  The pt reports that he is making urine. In the ed, he did make a 70 ml yellow urine that was sent for ua and tox screen  The ua had white cells- positive blood dip with 4 white cells- culture sent.    Also the pt has marked elevation of ast and alt. ? an acute alcohol related hepatic injury    Urine tox is positive for amphetamine- pt admits to using meth on occasions and marijuana- he reports only occasional alcohol- if hx if correct- unlikley that he will have etoh withdrawal  Labs Ordered and Resulted from Time of ED Arrival Up to the Time of Departure from the ED   ROUTINE UA WITH MICROSCOPIC REFLEX TO CULTURE - Abnormal; Notable for the following components:       Result Value    Glucose Urine 30 (*)     Blood Urine Moderate (*)     Protein Albumin Urine 100 (*)     Leukocyte Esterase Urine Moderate (*)     WBC Urine 42 (*)     RBC Urine 4 (*)     WBC Clumps Present (*)     Amorphous Crystals Few (*)     All other components within normal limits   DRUG ABUSE SCREEN 6 CHEM DEP URINE ([facility]) - Abnormal; Notable for the following components:    Amphetamine Qual Urine Positive (*)     Cannabinoids Qual Urine Positive (*)     All other components within normal limits   CBC WITH PLATELETS DIFFERENTIAL - Abnormal; Notable for the following components:    WBC 11.3 (*)     Absolute Neutrophil 9.0 (*)     All other components within normal limits   CK TOTAL - Abnormal; Notable for the following components:    CK Total 44,338 (*)     All other components within normal limits   BASIC METABOLIC PANEL - Abnormal; Notable for the following components:    Sodium 129 (*)     Urea Nitrogen 67 (*)     Creatinine 8.38 (*)     GFR Estimate 8 (*)     GFR Estimate If Black 9 (*)     Calcium 8.3 (*)     All other components within normal limits   HEPATIC PANEL - Abnormal; Notable for the following  components:    Albumin 2.9 (*)     Protein Total 6.5 (*)      (*)     AST 1,225 (*)     All other components within normal limits   PHOSPHORUS - Abnormal; Notable for the following components:    Phosphorus 4.6 (*)     All other components within normal limits   URINE CULTURE AEROBIC BACTERIAL            Assessments & Plan (with Medical Decision Making)   Likely polydrug abuse with renal failure due to rhabdo. I spoke with renal. The pt needs to be watched for fluid overload. Need to observe urine output given the creatinine. Will give one liter of normal saline- advise from renal.  Report given to internal med at [facility]    AT 1230 AM THE PT RIPPED OUT HIS IV AND WAS IN THE PROCESS OF LEAVING EVEN THOUGH I TOLD HIM THE SERIOUSNESS OF HIS PROBLEM. HE ONLY AGREED TO STAY AND HAVE THE IV RESTARTED AFTER I BLUNTLY STATED THE LEAVING COULD EASILY RESULT IN HIS DEATH. THE IV WILL NEED TO BE RESTARTED AND THE FLUIDS.     I have reviewed the nursing notes.    I have reviewed the findings, diagnosis, plan and need for follow up with the patient.    New Prescriptions    No medications on file       Final diagnoses:   Acute renal failure, unspecified acute renal failure type (H)     IVijaya, am serving as a trained medical scribe to document services personally performed by Uziel Otto MD, based on the provider's statements to me.      zUiel GAMBLE MD, was physically present and have reviewed and verified the accuracy of this note documented by Vijaya Mitchell.     11/5/2019   [facility], EMERGENCY DEPARTMENT     Uziel Otto MD  11/06/19 0110

## 2019-11-06 NOTE — ED NOTES
PT notes he will now stay and has put his gown back on. PT asking to go smoke and notified he can have a nicotine patch or gum. Fam Lee notified and will order.

## 2019-11-06 NOTE — PROGRESS NOTES
Brief Nephrology Note    Received page earlier in evening regarding this patient presenting with CK of 44k and creatinine of 8.4, only mildly hypertensive, saturating well on room air, and K in 4s. Unknown baseline creatinine, but likely ERROL given rhabdo picture in young patient who was down and delayed presentation.     Given degree of renal failure, would be cautious with IVFs. Typical goal 200ml/hr UOP (and 200ml/hr IVF in) is intended to prevent ERROL, which has already occurred here. The patient is at risk for volume overload with renal injury having already occurred. Discussed 1L IVF with ED provider overnight. Would repeat labs and if creatinine not improved after this initial, modest attempt at IVFs would hold off further IVFs until patient seen by renal consult dayteam as patient would then likely not have treatable rhabdo / preventable ATN.     Unclear day team to call given handoff time about 15 minutes ago and sticky note not yet updated.     Steve Pedro MD  Renal Fellow

## 2019-11-06 NOTE — PROGRESS NOTES
Orthopedic Surgery Progress Note   November 6, 2019    Subjective: Patient was examined at 1300 to determine whether symptoms are changing given concern for compartment syndrome. Patient was sleeping at time of presentation, awoke to answer questions. He endorses continued left buttock pain, maybe some improvement this afternoon, now 5/10, exacerbated with palpation of the area       Objective: BP (!) 150/87 (BP Location: Left arm)   Pulse 68   Temp 95.7  F (35.4  C) (Oral)   Resp 16   Wt 97.5 kg (215 lb)   SpO2 97%     General: NAD, complaint of tenderness with palpation of the left buttock; alert and oriented, cooperative with exam.   Cardio: RRR, extremities wwp.   Respiratory: Non-labored breathing.  MSK: Focused examination of the left LE: Toes wwp, DP 2+, bcr in all toes. Compartments soft in lower leg, tolerates passive stretch of toes. Tenderness to palpation of the left gluteal region, anterior thigh; tolerates passive flexion/extension/abduction/adduction of hip; EHL/FHL/GSC/TA with 5/5 strength. SILT SP/DP/Sa/[place]/T.     Imaging: US demonstrates no focal mass or drainable fluid collection    Assessment and Plan: Sawyer Walters is a 29 year old male with left buttcock/anterior thigh pain. Exam is largely unchanged, may even tolerate more palpation without exquisite tenderness when compared to exam earlier today.    The patient presents with left buttock/anterior thigh pain. Recent US does not demonstrate evidence of hematoma formation. Given that exam is largely unchanged, less suspicion for compartment syndrome, although continue serial exams and NPO status until further notice. May consider MR left pelvis, thigh if symptoms do not resolve.     Plan per previous note today.    Jamarcus Awad PA-C  Orthopaedic Surgery  Pager: [phone]

## 2019-11-06 NOTE — PLAN OF CARE
"Formal consult note to follow.  Briefly, patient is a 29-year-old male with complaint of left gluteal pain.  His pain began on Sunday morning following a period of 12 hours in which he \"passed out \"on his left side.  He reports that the pain has been constant since Ronnie morning.  He has been ambulatory since the onset of his pain.    On presentation, the patient was comfortably sleeping in bed.  He was lying on the left side.  When aroused from sleep, the patient demonstrated mild tenderness with flexion, internal rotation, and external rotation of the hip.  He is tender to palpation over the left gluteal region.  The gluteal compartment was firm but somewhat compressible.    AP/lateral of the left hip does not demonstrate evidence of fracture    The case was discussed with Eder Frausto, PGY 4.  We will obtain an ultrasound of the left gluteal region to assess for hematoma versus fluid collection.  We will also obtain ultrasound of the left lower extremity to assess for a DVT.     Based on my clinical exam, this patient's history and physical exam are not strongly indicative of an acute compartment syndrome.  However, given that his CK is elevated and he is tender to palpation, we will certainly have a low threshold for serial monitoring and, if indicated, [place] compartment pressure testing.      Jamarcus Awad PA-C 11/6/2019 9:59 AM  Orthopaedic Surgery    Please page me at [phone] with any questions/concerns. If there is no response, if it is a weekend, or if it is during evening hours, please page the orthopaedic surgery resident on call.      "

## 2019-11-06 NOTE — ED NOTES
Pt pulled out his IV and yelling in room that he wants to leave. Dr. Otto notified and spoke with pt. Pt educated about high risk of death if not treated. Encouraged pt strongly and repeatedly to stay. PT still c/o wanting to leave. Pt in room on phone currently.

## 2019-11-06 NOTE — PLAN OF CARE
"Pt admitted to unit around 0445 this morning. VSS on RA except hypertensive. Pt c/o L hip pain towards end of shift. Up ad chiara. RPIV, S.L. Received LR bolus overnight. No BM this shift, able to void spontaneously. Pt w/girlfriend @ bedside. Skin WDL except dryness & cracks on heels & some localized redness near L hip. Pt states \"that is where it hurts\". Denies nausea. Able to use call light appropriately & make needs known. Continue to monitor & follow POC.   "

## 2019-11-07 ENCOUNTER — APPOINTMENT (OUTPATIENT)
Dept: ULTRASOUND IMAGING | Facility: CLINIC | Age: 29
DRG: 557 | End: 2019-11-07

## 2019-11-07 LAB
ALBUMIN SERPL-MCNC: 2.6 G/DL (ref 3.4–5)
ALP SERPL-CCNC: 79 U/L (ref 40–150)
ALT SERPL W P-5'-P-CCNC: 422 U/L (ref 0–70)
ANION GAP SERPL CALCULATED.3IONS-SCNC: 9 MMOL/L (ref 3–14)
AST SERPL W P-5'-P-CCNC: 616 U/L (ref 0–45)
BACTERIA SPEC CULT: NO GROWTH
BILIRUB SERPL-MCNC: 0.5 MG/DL (ref 0.2–1.3)
BUN SERPL-MCNC: 64 MG/DL (ref 7–30)
CALCIUM SERPL-MCNC: 8.2 MG/DL (ref 8.5–10.1)
CHLORIDE SERPL-SCNC: 97 MMOL/L (ref 94–109)
CK SERPL-CCNC: ABNORMAL U/L (ref 30–300)
CO2 SERPL-SCNC: 24 MMOL/L (ref 20–32)
CREAT SERPL-MCNC: 9.33 MG/DL (ref 0.66–1.25)
CREAT UR-MCNC: 88 MG/DL
ERYTHROCYTE [DISTWIDTH] IN BLOOD BY AUTOMATED COUNT: 12.2 % (ref 10–15)
FRACT EXCRET NA UR+SERPL-RTO: 2.8 %
GFR SERPL CREATININE-BSD FRML MDRD: 7 ML/MIN/{1.73_M2}
GLUCOSE SERPL-MCNC: 106 MG/DL (ref 70–99)
HBV SURFACE AB SERPL IA-ACNC: 61.02 M[IU]/ML
HBV SURFACE AG SERPL QL IA: NONREACTIVE
HCT VFR BLD AUTO: 43.4 % (ref 40–53)
HGB BLD-MCNC: 14.8 G/DL (ref 13.3–17.7)
Lab: NORMAL
MCH RBC QN AUTO: 29.4 PG (ref 26.5–33)
MCHC RBC AUTO-ENTMCNC: 34.1 G/DL (ref 31.5–36.5)
MCV RBC AUTO: 86 FL (ref 78–100)
PHOSPHATE SERPL-MCNC: 5.7 MG/DL (ref 2.5–4.5)
PLATELET # BLD AUTO: 209 10E9/L (ref 150–450)
POTASSIUM SERPL-SCNC: 4.4 MMOL/L (ref 3.4–5.3)
PROT SERPL-MCNC: 6.4 G/DL (ref 6.8–8.8)
RBC # BLD AUTO: 5.03 10E12/L (ref 4.4–5.9)
SODIUM SERPL-SCNC: 130 MMOL/L (ref 133–144)
SODIUM UR-SCNC: 38 MMOL/L
SPECIMEN SOURCE: NORMAL
WBC # BLD AUTO: 10.4 10E9/L (ref 4–11)

## 2019-11-07 PROCEDURE — 87516 HEPATITIS B DNA AMP PROBE: CPT | Performed by: STUDENT IN AN ORGANIZED HEALTH CARE EDUCATION/TRAINING PROGRAM

## 2019-11-07 PROCEDURE — 87535 HIV-1 PROBE&REVERSE TRNSCRPJ: CPT | Performed by: STUDENT IN AN ORGANIZED HEALTH CARE EDUCATION/TRAINING PROGRAM

## 2019-11-07 PROCEDURE — 99233 SBSQ HOSP IP/OBS HIGH 50: CPT | Mod: GC | Performed by: STUDENT IN AN ORGANIZED HEALTH CARE EDUCATION/TRAINING PROGRAM

## 2019-11-07 PROCEDURE — 40000556 ZZH STATISTIC PERIPHERAL IV START W US GUIDANCE

## 2019-11-07 PROCEDURE — 87521 HEPATITIS C PROBE&RVRS TRNSC: CPT | Performed by: STUDENT IN AN ORGANIZED HEALTH CARE EDUCATION/TRAINING PROGRAM

## 2019-11-07 PROCEDURE — 84100 ASSAY OF PHOSPHORUS: CPT | Performed by: STUDENT IN AN ORGANIZED HEALTH CARE EDUCATION/TRAINING PROGRAM

## 2019-11-07 PROCEDURE — 90937 HEMODIALYSIS REPEATED EVAL: CPT

## 2019-11-07 PROCEDURE — 85027 COMPLETE CBC AUTOMATED: CPT | Performed by: STUDENT IN AN ORGANIZED HEALTH CARE EDUCATION/TRAINING PROGRAM

## 2019-11-07 PROCEDURE — 25000128 H RX IP 250 OP 636: Performed by: STUDENT IN AN ORGANIZED HEALTH CARE EDUCATION/TRAINING PROGRAM

## 2019-11-07 PROCEDURE — 12000001 ZZH R&B MED SURG/OB UMMC

## 2019-11-07 PROCEDURE — 82550 ASSAY OF CK (CPK): CPT | Performed by: STUDENT IN AN ORGANIZED HEALTH CARE EDUCATION/TRAINING PROGRAM

## 2019-11-07 PROCEDURE — 80053 COMPREHEN METABOLIC PANEL: CPT | Performed by: STUDENT IN AN ORGANIZED HEALTH CARE EDUCATION/TRAINING PROGRAM

## 2019-11-07 PROCEDURE — 76770 US EXAM ABDO BACK WALL COMP: CPT

## 2019-11-07 PROCEDURE — 36415 COLL VENOUS BLD VENIPUNCTURE: CPT | Performed by: STUDENT IN AN ORGANIZED HEALTH CARE EDUCATION/TRAINING PROGRAM

## 2019-11-07 PROCEDURE — 25800030 ZZH RX IP 258 OP 636: Performed by: INTERNAL MEDICINE

## 2019-11-07 RX ORDER — AMLODIPINE BESYLATE 5 MG/1
5 TABLET ORAL DAILY
Status: DISCONTINUED | OUTPATIENT
Start: 2019-11-08 | End: 2019-11-13

## 2019-11-07 RX ADMIN — SODIUM CHLORIDE 250 ML: 9 INJECTION, SOLUTION INTRAVENOUS at 18:37

## 2019-11-07 RX ADMIN — SODIUM CHLORIDE 300 ML: 9 INJECTION, SOLUTION INTRAVENOUS at 18:37

## 2019-11-07 RX ADMIN — Medication: at 18:37

## 2019-11-07 RX ADMIN — HYDROMORPHONE HYDROCHLORIDE 0.5 MG: 1 INJECTION, SOLUTION INTRAMUSCULAR; INTRAVENOUS; SUBCUTANEOUS at 02:04

## 2019-11-07 RX ADMIN — HYDROMORPHONE HYDROCHLORIDE 0.5 MG: 1 INJECTION, SOLUTION INTRAMUSCULAR; INTRAVENOUS; SUBCUTANEOUS at 10:46

## 2019-11-07 RX ADMIN — HYDROMORPHONE HYDROCHLORIDE 0.5 MG: 1 INJECTION, SOLUTION INTRAMUSCULAR; INTRAVENOUS; SUBCUTANEOUS at 21:24

## 2019-11-07 RX ADMIN — HYDROMORPHONE HYDROCHLORIDE 0.5 MG: 1 INJECTION, SOLUTION INTRAMUSCULAR; INTRAVENOUS; SUBCUTANEOUS at 16:58

## 2019-11-07 ASSESSMENT — ACTIVITIES OF DAILY LIVING (ADL)
ADLS_ACUITY_SCORE: 15

## 2019-11-07 ASSESSMENT — MIFFLIN-ST. JEOR
SCORE: 2098.88
SCORE: 2098.88

## 2019-11-07 ASSESSMENT — PAIN DESCRIPTION - DESCRIPTORS: DESCRIPTORS: INTERMITTENT;SORE

## 2019-11-07 NOTE — PROGRESS NOTES
"Orthopaedic Surgery Compartment Check Note 11/06/2019    S: No acute distress lying in bed. Central line being placed. Complains of pain and tenderness in the left gluteal area. Symptoms have been stable since admission. Denies numbness or tingling.    O:  Temp: 95.7  F (35.4  C) Temp src: Oral BP: (!) 150/87 Pulse: 68   Resp: 16 SpO2: 97 % O2 Device: None (Room air)      Exam:  Gen: No acute distress  Resp: Non-labored breathing  MSK:  LLE:  - Gluteal compartment full but compressible and moderately TTP  - Anterior, posterior, and adductor compartments soft and compressible  - Able to perform a SLR and abduct/adduct/flex/extend the hip against gravity without pain  - Moderate pain with passive adduction of the hip, pain improved with hip flexion  - SILT f/sp/dp/t/s/s  - Fires quads/ta/ehl/fhl/gsc  - Foot wwp    Recent Labs   Lab 11/06/19  0751 11/05/19  2140   WBC 12.8* 11.3*   HGB 14.9 15.2    182   CRP 64.0*  --      CK 19,000 <-- 44,000  Cr 9.9  WBC 12.8  ESR 17  CRP 64  Urine yellow    Assessment: Sawyer Walters is a 29 year old male who presented with left gluteal region pain consistent with an evolved compartment syndrome.     The patient presented on 11/5/2019 with a 3-day history of left buttock/thigh pain that occurred after \"blacking out\" on his left side.  He reported that his pain was substantial on 11/3/2019 but has slightly improved since hospital admission.  He had been able to ambulate up until hospitalization.  In the context of an elevated CK of 44,000 on presentation, his complaints are consistent with evolved compartment syndrome.  Other possible diagnoses considered were myositis and hematoma. U/S was negative for any fluid collection to a depth of 10 cm and negative for DVT.  CK decreased to19,000 on 11/6/2019.      Currently, there is no objective evidence for active compartment syndrome.     Plan:  Medicine Primary  Activity: As tolerated  DVT prophylaxis: Mechanical from an Ortho " standpoint  Imaging: Consider MRI LLE if symptoms fail to improve  Physical Therapy: Mobilization as tolerated  Diet: Okay for diet from an Ortho standpoint  Labs: Trend CK, CRP, Cr  Follow-up: Pending hospital course  Disposition: Pending hospital course    Ortho will continue to follow.    This patient will be communicated with Dr. Sepulveda, Orthopedic Surgery staff.    Eder Frausto MD PhD  Orthopaedic Surgery PGY4  Pager [phone]    Please page me directly with any questions/concerns during regular weekday hours before 5pm. If there is no response, if it is a weekend, or if it is during evening hours then please page the orthopaedic surgery resident on call.

## 2019-11-07 NOTE — PLAN OF CARE
Pt AOx4, VSS on RA. Up ad chiara. C/o pain in left leg. Given dilaudid x1 with some relief. RPIV infusing LR @ 50mL/hr. CK elevated this morning @[id number], MD notified. Voided x2 overnight. Up to shower. Able to use call light appropriately & make needs known. Continue to monitor & follow POC.

## 2019-11-07 NOTE — PLAN OF CARE
Pt just came back from dialysis. Per dialysis RN, 1 L fluid is removed.Urine sample needs to be collected for fractional urine when pt voids.

## 2019-11-07 NOTE — PROCEDURES
Dialysis catheter placement       The indications and risks of the temporary dialysis catheter placement, including infection, bleeding severe enough to require transfusion, unintended trauma to adjacent organs or blood vessels, pneumothorax, or death, were explained, understood and agreed by the patient and patient signed the Consent form and a copy is in the paper chart.     Patient was prepped and draped in the usual sterile manner. Placement site was anesthetized with 10 ml of  1% lidocaine. Real-time ultrasound was used to clearly visualize the R internal jugularvein   . The vein was canulated with 1  attempt(s) and dilated. Estimated blood loss during the procedure was  10 ml ml. There were no immediate complications. The patient tolerated the procedure without any significant distress or adverse event. Dr. Estrada was present/available for the key portions of the procedure.   Follow up CXR does not show any pneumothorax     Shankar Mendieta MD FACP  Nephrology Fellow   [facility]   Pager [phone]     I was present throughout entire procedure. Dialysis catheter placed for acute dialysis in setting of dense ERROL and oliguria.  Katrin Estrada MD   of Medicine  Department of Nephrology  [facility]

## 2019-11-07 NOTE — PROGRESS NOTES
"   [facility] Internal Medicine Progress  Note            Interval History:   ESTRELLITA, team notes reviewed   Sleeping, ignoring team this am   Denies complaints or discomfort             Assessment and Plan:   28 y/o M w/ERROL/AIN d/t rhabdomyolysis following 'black-out' d/t polysubstance abuse w/new left hip pain which started Sunday morning    # RHABDOMYOLYSIS w/AIN   # HYPONATREMIA   # UREMIA   # HYPERPHOSPHATEMIA  # NAGMA  # ANEPHRIC ERROL/RENAL FAILURE   # TRANSAMINITIS   CK 44K w/anephric creatinine rise concerning for AIN induced renal failure. Now receiving RRT.   - Nephrology following   - renal U/S pending    - RRT per nephrology   - LR@50cc/hr   - Strict I/O  - Hepatitis & HIV screen  - consider chem dep         # MECHANICAL FALL   # ACUTE LEFT HIP PAIN   No fx on imaging. Concern for compartment syndrome. Evaluated by vascular and orthopedic surgery.   - Dilauded 0.5mg q4hrs PRN      # LEUKOCYTOSIS   No evidence of infection. Likely reactive   - CTM      # POLYSUBSTANCE ABUSE   Tox screen + Amphetamines, Cannabinoids. Patient refusing to talk about it.   - consider CD consult tomorrow if pt willing       Diet: Regular   Fluids: LR@ 75cc/hr  DVT: SCDs, caution w/anticoagulation given uremia/possibe hip hematoma   Code Status: Full      This patient was staffed with Dr. Reid, the attending physician on service.     Aldair Solano IV, MD, MSc  Internal Medicine Resident, PGY3  [facility]   Pager [phone]            Objective:   /80 (BP Location: Left arm)   Pulse 72   Temp 96.7  F (35.9  C) (Oral)   Resp 18   Ht 1.854 m (6' 1\")   Wt 105 kg (231 lb 8 oz)   SpO2 99%   BMI 30.54 kg/m      No intake or output data in the 24 hours ending 11/06/19 1314    PHYSICAL EXAMINATION   GEN: A&Ox3, in pain w/movement, pleasant, cooperative   CV: RRR - no m/r/g  LUNGS: CTAB, nwb  ABD: +BS, soft, NT/ND  EXT: wwp, no edema, differed hip assessment d/t pt preference   NEURO: no FNDs      Labs, " Imaging, & Medications:   All labs, images, and medications reviewed by me.

## 2019-11-07 NOTE — PLAN OF CARE
Patient A & O X 4, VSS no respiratory distress noted, left hip pain manageable with Dilaudid IV PRN. Patient slept for most of the shift. Appetite ok, RIJ intact . Patient want off unit x 1 for smoke. Creatinine 9.33, has order for inpatient HD . Call light in reach significant other at bedside. Continue monitor I/O and update MD with change.

## 2019-11-07 NOTE — PLAN OF CARE
Pt had dialysis catheter placement this evening by Shankar Mendieta MD FACP  Nephrology Fellow at bed side.Pt left floor to dialysis shortly after dialysis catheter placement.  Pt triggered sepsis protocol while in dialysis, lactic acid is ordered.   Lactic acid came back 1.3.

## 2019-11-07 NOTE — PROGRESS NOTES
HEMODIALYSIS TREATMENT NOTE    Date: 11/6/2019  Time: 10:12 PM    Data:  Pre Wt: 97.5 kg (214 lb 15.2 oz)   Desired Wt: 96.5 kg   Post Wt: 96.7 kg (213 lb 3 oz)  Weight change: 0.8 kg  Ultrafiltration - Post Run Net Total Removed (mL): 1000 mL  Vascular Access Status: patent  Dialyzer Rinse: Heavy  Total Blood Volume Processed: 25.85 L Liters  Total Dialysis (Treatment) Time: 2.25 Hours    Lab:   Lactic acid and hep b    Interventions:  2.25 houirs of HD with [id number],  1000 mls pulled with no bp issues. He did clot off with 15 min to go. Hand off to PCN      Assessment:  ERROL patient in for his first ever run. New internal jugular. VSS A+O Calm and cooperative.      Plan:    Per renal

## 2019-11-07 NOTE — CONSULTS
Nephrology Initial Consult  November 6, 2019      Sawyer Walters MRN:[id number] YOB: 1990  Date of Admission:11/5/2019  Primary care provider: No Ref-Primary, Physician  Requesting physician: Porfirio Morales MD    ASSESSMENT AND RECOMMENDATIONS:     Acute renal failure secondary to most likely rhabdomyolysis.  Cannot completely rule out prerenal but most likely this could be secondary to ATN in the setting of rhabdomyolysis  Urine did show WBC clumps but no granular casts.  Patient has only made 157 mL of urine, his creatinine gradually continues to rise.  Though with some initial fluid resuscitation his CK has come down to 18,000.  He is now getting acidotic with bicarb being 18.  His potassium remains normal at 4.6.  His current creatinine 9.85.  Patient continues to have worsening renal function.  Patient currently needs renal replacement therapy.  Discussed indication, possible complications including electrolyte derangement, fatal arrhythmias and even death.  Alternative including continuing to watch his renal function closely discussed but at this stage dialyzing him sooner than later would be advised.  Patient verbalized understanding and agreed to proceed with dialysis.  Patient consented for CBC catheter for dialysis which was placed.  Follow-up x-ray of the chest confirmed proper position, there is no pneumothorax.  Chest x-ray is all discussed with radiology on-call team.  Patient was initiated on hemodialysis  We will also do a renal ultrasound  FeNA is pending.  Continue to monitor renal function, CK level, urine output.  Can continue gentle hydration with NS @ 50 ml/hr till he optimizes oral intake     Blood pressure: Borderline uncontrolled, secondary to ERROL, continue to monitor, anticipate HD will help   Volume: Patient is currently euvolemic  Patient has developed mild acidosis: Managed through hemodialysis.  Electrolytes: Mild asymptomatic hyponatremia, likely secondary to  underlying acute kidney injury.  Continue to monitor.  We will dialyze him on a lower sodium bath of 130.    Mild leukocytosis: Likely secondary to rhabdomyolysis.    Right hip pain, likely secondary to compartment syndrome with resultant rhabdomyolysis.  Being followed and managed by primary team and orthopedic surgery team.      Recommendations were communicated to primary team via note  Patient seen and discussed with Dr Natalie Mendieta MD  Nephrology Fellow   Pager [phone]  [facility]       REASON FOR CONSULT:   ERROL  Rhabdomyolysis    HISTORY OF PRESENT ILLNESS:  Admitting provider and nursing notes reviewed  Sawyer Walters is a 29 year old with history of polysubstance abuse admitted with rising creatinine and elevated CK level consistent with rhabdomyolysis.  Patient reported blacking out and not remembering any recent events over the last 12 hours.  He reports drinking alcohol and he was in the hotel.  He also reports using other drugs.  Urine drug screen was positive for methamphetamine and marijuana.  When he woke up he complained of acute left hip pain and unable to bear weight on that leg.  He was making urine but since admission he has not passed urine.  Though in the afternoon today he did pass some urine.  He denied hematuria.  Earlier evaluation in the ER showed significant elevation in creatinine of 8.38.  Unclear of what his baseline is.  Electrolytes showed hyponatremia 129.  Potassium 4.3.  Bicarb 24.  He has acute liver injury with , AST 1225.  His CK level was 44,338.  WBC was 11.3 INR 0.95, sed rate 17.  CRP 64 phosphorus was 4.6.  Urinalysis  Was positive for moderate amount of blood and leukocyte esterase, increased WBC in the urine, RBC of 24.  WBC clumps were present.  Amorphus crystals were present.  No granular casts reported.  Patient had 100 mg per DL of protein's.  pH was 5.5 and specific gravity was 1.008.  Urine culture is pending.    Currently  patient continues to have left hip area pain and difficulty bearing weight on the left lower extremity.  His sensation is intact.  Also has passed only minimal amount of urine.  Since morning only 157 mL.  No abdominal pain, nausea, vomiting, decreased appetite.  He is hungry and would like to eat.  No flank tenderness.      PAST  MEDICAL HISTORY:  Reviewed with patient on 11/06/2019     History reviewed. No pertinent past medical history.    No past surgical history on file.     MEDICATIONS:  PTA Meds  Prior to Admission medications    Not on File      Current Meds    gelatin absorbable  1 each Topical During Hemodialysis (from stock)     sodium chloride (PF)  3 mL Intracatheter Q8H     Infusion Meds    lactated ringers 75 mL/hr at 11/06/19 9323       ALLERGIES:    No Known Allergies    REVIEW OF SYSTEMS:  A comprehensive of systems was negative except as noted above.    SOCIAL HISTORY:   Social History     Socioeconomic History     Marital status: Single     Spouse name: Not on file     Number of children: Not on file     Years of education: Not on file     Highest education level: Not on file   Occupational History     Not on file   Social Needs     Financial resource strain: Not on file     Food insecurity:     Worry: Not on file     Inability: Not on file     Transportation needs:     Medical: Not on file     Non-medical: Not on file   Tobacco Use     Smoking status: Not on file   Substance and Sexual Activity     Alcohol use: Not on file     Drug use: Not on file     Sexual activity: Not on file   Lifestyle     Physical activity:     Days per week: Not on file     Minutes per session: Not on file     Stress: Not on file   Relationships     Social connections:     Talks on phone: Not on file     Gets together: Not on file     Attends Judaism service: Not on file     Active member of club or organization: Not on file     Attends meetings of clubs or organizations: Not on file     Relationship status: Not on  file     Intimate partner violence:     Fear of current or ex partner: Not on file     Emotionally abused: Not on file     Physically abused: Not on file     Forced sexual activity: Not on file   Other Topics Concern     Not on file   Social History Narrative     Not on file     Reviewed with patient     FAMILY MEDICAL HISTORY:   No family history on file.  Reviewed with patient     PHYSICAL EXAM:   Temp  Av.7  F (35.9  C)  Min: 95.7  F (35.4  C)  Max: 97.9  F (36.6  C)      Pulse  Av.4  Min: 60  Max: 80 Resp  Av  Min: 16  Max: 25  SpO2  Av.8 %  Min: 97 %  Max: 100 %       BP (!) 139/104   Pulse 68   Temp 97.7  F (36.5  C) (Oral)   Resp 25   Wt 97.5 kg (215 lb)   SpO2 99%    Date 19 0700 - 19 0659   Shift 3774-1274 3330-6452 5202-7668 24 Hour Total   INTAKE   I.V. 157.5   157.5   Shift Total(mL/kg) 157.5(1.62)   157.5(1.62)   OUTPUT   Shift Total(mL/kg)       Weight (kg) 97.52 97.52 97.52 97.52      Admit Weight: 97.5 kg (215 lb)     GENERAL APPEARANCE: no distress,  awake  EYES: no scleral icterus, pupils equal  HENT: Neck supple . Oral exam normal   Lymphatics: no cervical or supraclavicular LAD  Pulmonary: lungs clear to auscultation with equal breath sounds bilaterally, no clubbing  CV: regular rhythm, normal rate, no rub   - JVD    - Edema none  GI: soft, nontender, normal bowel sounds  MS: no evidence of inflammation in joints, no muscle tenderness  :no johnson  SKIN: no rash, warm, dry, no cyanosis  NEURO: face symmetric,no asterixis   Musculoskeletal : Tenderness in the left hip region, along the greater trochanter area  Extremities : distal CMS intact  LABS:   CMP  Recent Labs   Lab 19  1307 19  0751 19  2140   * 127* 129*   POTASSIUM 4.6 4.5 4.3   CHLORIDE 96 95 94   CO2 18* 20 24   ANIONGAP 13 11 11   GLC 83 89 93   BUN 68* 71* 67*   CR 9.85* 9.31* 8.38*   GFRESTIMATED 6* 7* 8*   GFRESTBLACK 7* 8* 9*   JOSTIN 8.3* 8.2* 8.3*   PHOS  --   --  4.6*    PROTTOTAL  --  6.1* 6.5*   ALBUMIN  --  2.6* 2.9*   BILITOTAL  --  0.6 0.4   ALKPHOS  --  76 89   AST  --  891* 1,225*   ALT  --  478* 570*     CBC  Recent Labs   Lab 11/06/19  1307 11/06/19  0751 11/05/19  2140   HGB 14.6 14.9 15.2   WBC  --  12.8* 11.3*   RBC  --  4.96 4.99   HCT  --  41.6 43.0   MCV  --  84 86   MCH  --  30.0 30.5   MCHC  --  35.8 35.3   RDW  --  12.0 12.1   PLT  --  188 182     INR  Recent Labs   Lab 11/06/19  0751   INR 0.95     ABGNo lab results found in last 7 days.   URINE STUDIES  Recent Labs   Lab Test 11/05/19  2141   COLOR Yellow   APPEARANCE Slightly Cloudy   URINEGLC 30*   URINEBILI Negative   URINEKETONE Negative   SG 1.008   UBLD Moderate*   URINEPH 5.5   PROTEIN 100*   NITRITE Negative   LEUKEST Moderate*   RBCU 4*   WBCU 42*     No lab results found.  PTH  No lab results found.  IRON STUDIES  No lab results found.    IMAGING:  Pertinent test results reviewed    Anam Chaparro MD    I was present with the fellow during the history and exam.  I discussed the case with the fellow and agree with the findings as documented in the assessment and plan.  Katrin Estrada

## 2019-11-08 LAB
ALBUMIN SERPL-MCNC: 2.4 G/DL (ref 3.4–5)
ALP SERPL-CCNC: 73 U/L (ref 40–150)
ALT SERPL W P-5'-P-CCNC: 306 U/L (ref 0–70)
ANION GAP SERPL CALCULATED.3IONS-SCNC: 7 MMOL/L (ref 3–14)
AST SERPL W P-5'-P-CCNC: 302 U/L (ref 0–45)
BILIRUB SERPL-MCNC: 0.5 MG/DL (ref 0.2–1.3)
BUN SERPL-MCNC: 49 MG/DL (ref 7–30)
CALCIUM SERPL-MCNC: 8.6 MG/DL (ref 8.5–10.1)
CHLORIDE SERPL-SCNC: 98 MMOL/L (ref 94–109)
CK SERPL-CCNC: 8428 U/L (ref 30–300)
CO2 SERPL-SCNC: 27 MMOL/L (ref 20–32)
CREAT SERPL-MCNC: 8.8 MG/DL (ref 0.66–1.25)
GFR SERPL CREATININE-BSD FRML MDRD: 7 ML/MIN/{1.73_M2}
GLUCOSE SERPL-MCNC: 94 MG/DL (ref 70–99)
PHOSPHATE SERPL-MCNC: 4.8 MG/DL (ref 2.5–4.5)
POTASSIUM SERPL-SCNC: 5 MMOL/L (ref 3.4–5.3)
PROT SERPL-MCNC: 5.8 G/DL (ref 6.8–8.8)
SODIUM SERPL-SCNC: 132 MMOL/L (ref 133–144)

## 2019-11-08 PROCEDURE — 12000001 ZZH R&B MED SURG/OB UMMC

## 2019-11-08 PROCEDURE — 99233 SBSQ HOSP IP/OBS HIGH 50: CPT | Mod: GC | Performed by: PEDIATRICS

## 2019-11-08 PROCEDURE — 25000128 H RX IP 250 OP 636: Performed by: STUDENT IN AN ORGANIZED HEALTH CARE EDUCATION/TRAINING PROGRAM

## 2019-11-08 PROCEDURE — 80053 COMPREHEN METABOLIC PANEL: CPT | Performed by: STUDENT IN AN ORGANIZED HEALTH CARE EDUCATION/TRAINING PROGRAM

## 2019-11-08 PROCEDURE — 90937 HEMODIALYSIS REPEATED EVAL: CPT

## 2019-11-08 PROCEDURE — 36415 COLL VENOUS BLD VENIPUNCTURE: CPT | Performed by: STUDENT IN AN ORGANIZED HEALTH CARE EDUCATION/TRAINING PROGRAM

## 2019-11-08 PROCEDURE — 25800030 ZZH RX IP 258 OP 636: Performed by: INTERNAL MEDICINE

## 2019-11-08 PROCEDURE — 84100 ASSAY OF PHOSPHORUS: CPT | Performed by: STUDENT IN AN ORGANIZED HEALTH CARE EDUCATION/TRAINING PROGRAM

## 2019-11-08 PROCEDURE — 25000132 ZZH RX MED GY IP 250 OP 250 PS 637: Performed by: STUDENT IN AN ORGANIZED HEALTH CARE EDUCATION/TRAINING PROGRAM

## 2019-11-08 PROCEDURE — 82550 ASSAY OF CK (CPK): CPT | Performed by: STUDENT IN AN ORGANIZED HEALTH CARE EDUCATION/TRAINING PROGRAM

## 2019-11-08 PROCEDURE — 25000128 H RX IP 250 OP 636: Performed by: INTERNAL MEDICINE

## 2019-11-08 RX ORDER — HEPARIN SODIUM 1000 [USP'U]/ML
500 INJECTION, SOLUTION INTRAVENOUS; SUBCUTANEOUS CONTINUOUS
Status: DISCONTINUED | OUTPATIENT
Start: 2019-11-08 | End: 2019-11-09

## 2019-11-08 RX ORDER — LABETALOL 20 MG/4 ML (5 MG/ML) INTRAVENOUS SYRINGE
5 EVERY 30 MIN PRN
Status: DISCONTINUED | OUTPATIENT
Start: 2019-11-08 | End: 2019-11-16 | Stop reason: HOSPADM

## 2019-11-08 RX ORDER — HEPARIN SODIUM 1000 [USP'U]/ML
500 INJECTION, SOLUTION INTRAVENOUS; SUBCUTANEOUS ONCE
Status: COMPLETED | OUTPATIENT
Start: 2019-11-08 | End: 2019-11-08

## 2019-11-08 RX ORDER — ACETAMINOPHEN 325 MG/1
975 TABLET ORAL EVERY 6 HOURS PRN
Status: DISCONTINUED | OUTPATIENT
Start: 2019-11-08 | End: 2019-11-09

## 2019-11-08 RX ORDER — LIDOCAINE 4 G/G
1 PATCH TOPICAL
Status: DISCONTINUED | OUTPATIENT
Start: 2019-11-08 | End: 2019-11-16 | Stop reason: HOSPADM

## 2019-11-08 RX ADMIN — HEPARIN SODIUM 500 UNITS/HR: 1000 INJECTION INTRAVENOUS; SUBCUTANEOUS at 16:23

## 2019-11-08 RX ADMIN — HEPARIN SODIUM 500 UNITS: 1000 INJECTION INTRAVENOUS; SUBCUTANEOUS at 16:23

## 2019-11-08 RX ADMIN — HYDROMORPHONE HYDROCHLORIDE 0.5 MG: 1 INJECTION, SOLUTION INTRAMUSCULAR; INTRAVENOUS; SUBCUTANEOUS at 10:44

## 2019-11-08 RX ADMIN — SODIUM CHLORIDE 250 ML: 9 INJECTION, SOLUTION INTRAVENOUS at 16:00

## 2019-11-08 RX ADMIN — AMLODIPINE BESYLATE 5 MG: 5 TABLET ORAL at 09:18

## 2019-11-08 RX ADMIN — SODIUM CHLORIDE 300 ML: 9 INJECTION, SOLUTION INTRAVENOUS at 16:00

## 2019-11-08 RX ADMIN — HYDROMORPHONE HYDROCHLORIDE 0.5 MG: 1 INJECTION, SOLUTION INTRAMUSCULAR; INTRAVENOUS; SUBCUTANEOUS at 02:01

## 2019-11-08 RX ADMIN — LIDOCAINE 1 PATCH: 560 PATCH PERCUTANEOUS; TOPICAL; TRANSDERMAL at 22:21

## 2019-11-08 RX ADMIN — HYDROMORPHONE HYDROCHLORIDE 0.5 MG: 1 INJECTION, SOLUTION INTRAMUSCULAR; INTRAVENOUS; SUBCUTANEOUS at 06:47

## 2019-11-08 RX ADMIN — LIDOCAINE 1 PATCH: 560 PATCH PERCUTANEOUS; TOPICAL; TRANSDERMAL at 10:41

## 2019-11-08 RX ADMIN — HYDROMORPHONE HYDROCHLORIDE 0.5 MG: 1 INJECTION, SOLUTION INTRAMUSCULAR; INTRAVENOUS; SUBCUTANEOUS at 15:36

## 2019-11-08 ASSESSMENT — ACTIVITIES OF DAILY LIVING (ADL)
ADLS_ACUITY_SCORE: 15

## 2019-11-08 ASSESSMENT — MIFFLIN-ST. JEOR: SCORE: 2111.13

## 2019-11-08 NOTE — PLAN OF CARE
Pt is alert and oriented x 4. C/o pain in left hip and leg. Pt requested dilaudid X2. SBA d/t pt being unsteady on his feet and pain in legs. Pt left unit to smoke once early in the night. Family at bedside.

## 2019-11-08 NOTE — PLAN OF CARE
Patient  A & OX 4, VSS no respiratory distress noted. RIJ intact . Patient has been sleeping all shift. C/o left hip and leg pain received Dilaudid 0.5mg IV x 1 and applied Lidocaine patch to left hip. Patient family at bedside. Call light in reach patient use call light appropriately. Continue with POC and update MD with change.

## 2019-11-08 NOTE — PROGRESS NOTES
[facility] Internal Medicine Progress  Note            Interval History:   ESTRELLITA, team notes reviewed   Sleeping comfortably   Notes continued left hip pain, requests review of pain plan - opioids were helping but now breaking through  Would like tylenol, also agrees lidocaine patch would be very helpful   Feels it was made a little worse by walking around a lot   Would like to speak to a RD regarding food choices. Feels very limited by dialysis diet.   No other complaints, appreciative of cares             Assessment and Plan:   28 y/o M w/ERROL/AIN d/t rhabdomyolysis following 'black-out' d/t polysubstance abuse w/new left hip pain which started Sunday morning    # RHABDOMYOLYSIS w/AIN - CK down-trending   # HYPONATREMIA - improving   # UREMIA - improving   # HYPERPHOSPHATEMIA   # NAGMA - resolved   # ANEPHRIC ERROL/RENAL FAILURE - on RRT  # TRANSAMINITIS - improving   CK 44K w/anephric creatinine rise concerning for AIN induced renal failure. Now receiving RRT.   - Nephrology following   - RRT per nephrology, currently daily    - can consider discharge once requiring intermittent iHD   - Unclear at this point potential for recovery, will CTM  - Strict I/O  - RD consult for renal diet education/review       # MECHANICAL FALL   # ACUTE LEFT HIP PAIN   No fx on imaging. Concern for compartment syndrome. Evaluated by vascular and orthopedic surgery. No evidence of compartment syndrome. Appreciate surgeries expletives.    - Dilauded 0.5mg q4hrs PRN  - Tylenol 975 q6hrs PRN  - lidocaine patch qAM      # LEUKOCYTOSIS   No evidence of infection. Likely reactive   - CTM      # POLYSUBSTANCE ABUSE   Tox screen + Amphetamines, Cannabinoids. Patient refusing to talk about it.   - consider CD consult tomorrow if pt willing       Diet: Regular   Fluids: LR@ 75cc/hr  DVT: SCDs, caution w/anticoagulation given uremia/possibe hip hematoma   Code Status: Full      This patient was staffed with Dr. Morales, the attending physician on  "service.     Aldair Solano IV, MD, MSc  Internal Medicine Resident, PGY3  [facility]   Pager [phone]            Objective:   /87 (BP Location: Right arm)   Pulse 67   Temp 98.4  F (36.9  C) (Oral)   Resp 16   Ht 1.854 m (6' 1\")   Wt 108.5 kg (239 lb 3.2 oz)   SpO2 97%   BMI 31.56 kg/m      No intake or output data in the 24 hours ending 11/06/19 1314    PHYSICAL EXAMINATION   GEN: A&Ox3, in pain w/movement, pleasant, cooperative   CV: RRR - no m/r/g  LUNGS: CTAB, nwb  ABD: +BS, soft, NT/ND  EXT: wwp, no edema, hip tense, painful to palpation - improved from previous   NEURO: no FNDs      Labs, Imaging, & Medications:   All labs, images, and medications reviewed by me.           "

## 2019-11-08 NOTE — PROGRESS NOTES
HEMODIALYSIS TREATMENT NOTE    Date: 11/7/2019  Time: 7:11 PM    Data:  Pre Wt: 97.5 kg (214 lb 15.2 oz)   Desired Wt: 97.5 kg   Post Wt: 97.5 kg (213 lb 3 oz)  Weight change: 0.8 kg  Ultrafiltration - Post Run Net Total Removed (mL):0 mL  Vascular Access Status: patent  Dialyzer Rinse: Heavy  Total Blood Volume Processed: 40 L Liters  Total Dialysis (Treatment) Time: 3 Hours    Lab:   no    Interventions:Assessments  Pt dialyzed tonight for 3hrs on a K-3 Ca-2.25 bath via RIJ. No UF per renal orders. VSS throughout. System clotted snf through HD and a new system was set up. Pain meds given for L hip pain. Pt rested during todays tx. Report to PCN post HD.     Plan:    Per renal team

## 2019-11-08 NOTE — PROGRESS NOTES
"CLINICAL NUTRITION SERVICES - BRIEF NOTE  Received provider consult for patient / family request with comments: \"Patient having difficulty with new dialysis diet. Would like to speak to a dietitian about choices\".    Attempted to see patient twice today. Patient and significant other sound asleep. Left education packet at bedside.   --Nutrition education to be completed as able/appropriate for dialysis diet.     RD will follow per LOS protocol or if re-consulted.   Dickson Fofana RD/JULIANNE  Pager [phone]    Addendum: 3:24 pm:   Visited with patient. He denied need for diet education. Patient is requesting a dialysis menu so he can order his meals. Per patient, he is having a hard time ordering meals due to dialysis restrictions.    Intervention: Provided patient with ( At your request dialysis room service menu).        "

## 2019-11-08 NOTE — PROGRESS NOTES
"Orthopaedic Surgery Progress Note 11/08/2019    E: No acute events overnight.    S: Pain left buttock/thigh stable. Has been able get out of bed and ambulate. Denies numbness or tingling. Denies cp/sob/n/v. Plan of care reviewed and questions answered.    O:  Temp: 97  F (36.1  C) Temp src: Oral BP: 138/73 Pulse: 71 Heart Rate: 90 Resp: 14 SpO2: 98 % O2 Device: None (Room air)      Exam:  Gen: No acute distress, resting comfortably in bed with significant other  Resp: Non-labored breathing  MSK:  LLE:  - Gluteal compartment full but compressible and moderately TTP  - Anterior (mildly TTP), posterior, and adductor compartments soft and compressible without pain  - Able to perform a SLR and abduct/adduct/flex/extend the hip against gravity without pain  - SILT f/sp/dp/t/s/s  - Fires quads/ta/ehl/fhl/gsc  - Foot wwp    Recent Labs   Lab 11/07/19  0512 11/06/19  1307 11/06/19  0751 11/05/19  2140   WBC 10.4  --  12.8* 11.3*   HGB 14.8 14.6 14.9 15.2     --  188 182   CRP  --   --  64.0*  --        CK 8,428 <-- 19,500 <-- 19,000 <-- 44,000  Cr 8.8 <-- 9.3 <-- 9.9  WBC 10.4 <-- 12.8  ESR 17  CRP 64 (11/6)     Assessment: Sawyer Walters is a 29 year old male who presented with left gluteal region pain consistent with an evolved compartment syndrome.      The patient presented on 11/5/2019 with a 3-day history of left buttock/thigh pain that occurred after \"blacking out\" on his left side.  He reported that his pain was substantial on 11/3/2019 but has slightly improved since hospital admission.  He had been able to ambulate up until hospitalization.  In the context of an elevated CK of 44,000 on presentation, his complaints are consistent with evolved compartment syndrome.  Other possible diagnoses considered were myositis and hematoma. U/S was negative for any fluid collection to a depth of 10 cm and negative for DVT.     No evidence of active compartment syndrome. Patient able to ambulate. Serum CK " improving.     Plan:  Medicine Primary  Consults: Nephrology, PT/OT  Activity: As tolerated  DVT prophylaxis: Mechanical from an Ortho standpoint  Imaging: Consider MRI LLE if symptoms fail to improve  Physical Therapy: Mobilization as tolerated  Diet: Okay for diet from an Ortho standpoint  Labs: Trend CK, CRP, Cr  Follow-up: Per primary, pending hospital course  Disposition: Per primary, pending hospital course     Orthopedic staff: Dr. Derick Frausto MD PhD  Orthopaedic Surgery PGY4  Pager [phone]    Please page me directly with any questions/concerns during regular weekday hours before 5pm. If there is no response, if it is a weekend, or if it is during evening hours then please page the orthopaedic surgery resident on call.

## 2019-11-08 NOTE — PROGRESS NOTES
Nephrology Progress Note  11/08/2019         Assessment & Recommendations:     Sawyer Walters is a 29 year old with history of polysubstance abuse admitted with rising creatinine and elevated CK level consistent with rhabdomyolysis.Patient reported blacking out and not remembering any recent events over the last 12 hours prior to seeking medical attention. Labs consistent with ERROL , Rhabdomyolysis      Acute renal failure secondary to rhabdomyolysis leading to ATN     Cannot completely rule out prerenal but most likely this could be secondary to ATN in the setting of rhabdomyolysis  Urine did show WBC clumps but no granular casts.  FENA 2.8%  Cr 9.85 ( 11/6) --> 9.33--> 8.80  CK: 40K ( 11/5) --> 18.5K( 11/6) --> 19.5 K( 11/7) --> 8k  Weight: 97.5 ( 11/5) --> 108.5 ( 11/7)   UOP : minimal     Will run HD today ( 3rd day) . He is euvolemic , will only do 500 ml UF  Addendum : today was 3rd run . Dialysis reported of HD lines clotting , so we ran him with heparin ( 500 units bolus and then 500 units/hr maintenance to avoid clotting . Numerous arterial pressure alarms with air noted in lines. Limbs flushed and lines reversed which seemed to resolve the issue . Also dialysis RN has noted catheter very positional and patient has to be reminded to minimize movement and talking during run  Total 500 ml UF achieved.    Patient will need Tunnelled HD line as we anticipate he will need HD at least for the next few weeks . We request primary team to place a IR consult for Tunneled HD catheter    Blood pressure: borderline uncontrolled . He is euvolemic - anticipate will improve with HD   Continue amlodipine 5 mg daily  Labetalol prn    Acidosis - resolved with HD    Electrolytes : mild hyponatremia , likely secondary to ERROL. Continue to monitor    Mild leukocytosis: Likely secondary to rhabdomyolysis. Resolved      Right hip pain, likely secondary to compartment syndrome with resultant rhabdomyolysis.  Being followed and  "managed by primary team and orthopedic surgery team.    Abnormal LFT , improving         Recommendations were communicated to primary team via note  Patient seen and discussed with Dr Natalie Mendieta MD  Nephrology Fellow   Pager [phone]  [facility]        Interval History :   Nursing and provider notes from last 24 hours reviewed.  In the last 24 hours Sawyer Walters has been initiated on HD   Cr has not improved  Minimal UOP still    Review of Systems:     Constitutional : No fever. Chills  Neuro: No confusion, headache, focal weakness  Chest: No SOB . No cough  Cardiovascular : No Chest pain   Extremities:  no  swelling  Gastrointestinal : No abdominal pain, nausea,vomiting. No diarrhea, constipation. No black stool .   : No flank pain , hematuria or voiding difficulty   Musculoskeletal: left hip pain       Physical Exam:   I/O last 3 completed shifts:  In: 200 [P.O.:200]  Out: 90 [Urine:90]   /87 (BP Location: Right arm)   Pulse 67   Temp 98.4  F (36.9  C) (Oral)   Resp 16   Ht 1.854 m (6' 1\")   Wt 108.5 kg (239 lb 3.2 oz)   SpO2 97%   BMI 31.56 kg/m       GENERAL APPEARANCE: no distress,  awake  Pulmonary: lungs clear to auscultation with equal breath sounds bilaterally, no clubbing  CV: regular rhythm, normal rate, no rub   - JVD    - Edema none  GI: soft, nontender, normal bowel sounds  MS: no evidence of inflammation in joints, no muscle tenderness  NEURO: face symmetric,no asterixis   Musculoskeletal : Tenderness in the left hip region, along the greater trochanter area  Extremities : distal CMS intact  Labs:   All labs reviewed by me  Electrolytes/Renal -   Recent Labs   Lab Test 11/08/19  0637 11/07/19  0512 11/06/19  1307  11/05/19  2140   * 130* 127*   < > 129*   POTASSIUM 5.0 4.4 4.6   < > 4.3   CHLORIDE 98 97 96   < > 94   CO2 27 24 18*   < > 24   BUN 49* 64* 68*   < > 67*   CR 8.80* 9.33* 9.85*   < > 8.38*   GLC 94 106* 83   < > 93   JOSTIN 8.6 8.2* 8.3* "   < > 8.3*   PHOS 4.8* 5.7*  --   --  4.6*    < > = values in this interval not displayed.       CBC -   Recent Labs   Lab Test 11/07/19  0512 11/06/19  1307 11/06/19  0751 11/05/19  2140   WBC 10.4  --  12.8* 11.3*   HGB 14.8 14.6 14.9 15.2     --  188 182       LFTs -   Recent Labs   Lab Test 11/08/19  0637 11/07/19  0512 11/06/19  0751   ALKPHOS 73 79 76   BILITOTAL 0.5 0.5 0.6   * 422* 478*   * 616* 891*   PROTTOTAL 5.8* 6.4* 6.1*   ALBUMIN 2.4* 2.6* 2.6*       Iron Panel - No lab results found.      Imaging:  Pertinent imaging reviewed    Current Medications:    sodium chloride 0.9%  250 mL Intravenous Once in dialysis     sodium chloride 0.9%  300 mL Hemodialysis Machine Once     amLODIPine  5 mg Oral Daily     gelatin absorbable  1 each Topical During Hemodialysis (from stock)     lidocaine  1 patch Transdermal Q24H     lidocaine   Transdermal Q8H     lidocaine   Transdermal Q24h     - MEDICATION INSTRUCTIONS -   Does not apply Once     sodium chloride (PF)  3 mL Intracatheter Q8H       Anam Chaparro MD  I was present with the fellow during the history and exam.  I discussed the case with the fellow and agree with the findings as documented in the assessment and plan.  Katrin Estrada

## 2019-11-08 NOTE — PROGRESS NOTES
Social Work Services Progress Note    Hospital Day: 4  Date of Initial Social Work Evaluation:  Not completed  Collaborated with:  Pt     Data:  Pt is a 29 year old with history of polysubstance abuse admitted with rising creatinine and elevated CK level consistent with rhabdomyolysis.Patient reported blacking out and not remembering any recent events over the last 12 hours prior to seeking medical attention. Labs consistent with ERROL , Rhabdomyolysis    Intervention:  SW met with pt to discuss CD resources. Pt was going with three friends downstairs to go outside. SW provided introduction and role within pt's hospitalization. SW discussed connecting pt with resources.    Pt declined resources at this time, but is comfortable reaching out to SW if he has questions or wants resources. SW wrote name on board.    Assessment:  No resources requested at this time.    Plan:    Anticipated Disposition:  Home, no needs identified    Barriers to d/c plan:  Medical stability    Follow Up:  SW to be available for support and resources    CANDE [place]   Covering for Unit: 5A/5B   Pager: [phone]

## 2019-11-08 NOTE — PLAN OF CARE
Pt A&O x4, Hypertensive other VSS. Pt in dialysis. PIV SL. Pain to L hip managed with dilaudid IV x2. RIJ intact. Off unit to smoke x1. Creatinine 9.33. Will continue to monitor.

## 2019-11-08 NOTE — PROGRESS NOTES
Nephrology Progress Note  11/07/2019         Assessment & Recommendations:     Sawyer Walters is a 29 year old with history of polysubstance abuse admitted with rising creatinine and elevated CK level consistent with rhabdomyolysis.Patient reported blacking out and not remembering any recent events over the last 12 hours prior to seeking medical attention. Labs consistent with ERROL , Rhabdomyolysis        Acute renal failure secondary to most likely rhabdomyolysis.    Cannot completely rule out prerenal but most likely this could be secondary to ATN in the setting of rhabdomyolysis  Urine did show WBC clumps but no granular casts.    Cr 9.85 ( 11/6) --> 9.33  CK: 40K ( 11/5) --> 18.5K( 11/6) --> 19.5 K( 11/7)   Weight: 97.5 ( 11/5) --> 108.5 ( 11/7)   UOP minimal     We will do HD run today too - 3hrs with anticipation this will probably increase chance of improving his renal function    Blood pressure: uncontrolled . He is euvolemic  Likely  secondary to ERROL, continue to monitor  May need more aggressive UF   Though clinically does not appear volume overloaded  Also target BP < 130/90 .   We suggest he can be started on Amlodipine 5 mg daily.  Also can be started on Labetalol 5 mg IV every 30 mins prn for SBP > 160     Addendum - discussed BP issue with on call Resident, who will also assess patient if he is withdrswing from alcohol . Of note he was drinking alcohol prior to blacking out         Acidosis - resolved with HD    Electrolytes : mild hyponatremia , likely secondary to ERROL. Continue to monitor    Mild leukocytosis: Likely secondary to rhabdomyolysis. Resolved      Right hip pain, likely secondary to compartment syndrome with resultant rhabdomyolysis.  Being followed and managed by primary team and orthopedic surgery team.        Recommendations were communicated to primary team via note  Patient seen and discussed with Dr Natalie Mendieta MD  Nephrology Fellow   Pager [phone]  [place]  "[facility]        Interval History :   Nursing and provider notes from last 24 hours reviewed.  In the last 24 hours Sawyer Walters has been initiated on HD   Cr has not improved  Minimal UOP    Review of Systems:     Constitutional : No fever. Chills  Neuro: No confusion, headache, focal weakness  Chest: No SOB . No cough  Cardiovascular : No Chest pain   Extremities:  no  swelling  Gastrointestinal : No abdominal pain, nausea,vomiting. No diarrhea, constipation. No black stool .   : No flank pain , hematuria or voiding difficulty   Musculoskeletal: left hip pain       Physical Exam:   I/O last 3 completed shifts:  In: 200 [P.O.:200]  Out: 1090 [Urine:90; Other:1000]   BP (!) 180/107   Pulse 66   Temp 97.3  F (36.3  C) (Oral)   Resp 23   Ht 1.854 m (6' 1\")   Wt 108.5 kg (239 lb 3.2 oz)   SpO2 100%   BMI 31.56 kg/m       GENERAL APPEARANCE: no distress,  awake  Pulmonary: lungs clear to auscultation with equal breath sounds bilaterally, no clubbing  CV: regular rhythm, normal rate, no rub   - JVD    - Edema none  GI: soft, nontender, normal bowel sounds  MS: no evidence of inflammation in joints, no muscle tenderness  NEURO: face symmetric,no asterixis   Musculoskeletal : Tenderness in the left hip region, along the greater trochanter area  Extremities : distal CMS intact  Labs:   All labs reviewed by me  Electrolytes/Renal -   Recent Labs   Lab Test 11/07/19 0512 11/06/19  1307 11/06/19  0751 11/05/19  2140   * 127* 127* 129*   POTASSIUM 4.4 4.6 4.5 4.3   CHLORIDE 97 96 95 94   CO2 24 18* 20 24   BUN 64* 68* 71* 67*   CR 9.33* 9.85* 9.31* 8.38*   * 83 89 93   JOSTIN 8.2* 8.3* 8.2* 8.3*   PHOS 5.7*  --   --  4.6*       CBC -   Recent Labs   Lab Test 11/07/19  0512 11/06/19  1307 11/06/19  0751 11/05/19  2140   WBC 10.4  --  12.8* 11.3*   HGB 14.8 14.6 14.9 15.2     --  188 182       LFTs -   Recent Labs   Lab Test 11/07/19  0512 11/06/19  0751 11/05/19  2140   ALKPHOS 79 76 89 "   BILITOTAL 0.5 0.6 0.4   * 478* 570*   * 891* 1,225*   PROTTOTAL 6.4* 6.1* 6.5*   ALBUMIN 2.6* 2.6* 2.9*       Iron Panel - No lab results found.      Imaging:  Pertinent imaging reviewed    Current Medications:    gelatin absorbable  1 each Topical During Hemodialysis (from stock)     sodium chloride (PF)  3 mL Intracatheter Q8H       Anam Chaparro MD  I was present with the fellow during the history and exam.  I discussed the case with the fellow and agree with the findings as documented in the assessment and plan.  Katrin Estrada

## 2019-11-08 NOTE — PROGRESS NOTES
"Orthopaedic Surgery Progress Note 11/07/2019    S: Patient reported by charge RN to be out in a wheel chair with his girlfriend. Ortho will return at a later time to evaluate the patient.    O:  Temp: 97  F (36.1  C) Temp src: Oral BP: (!) 157/96 Pulse: 64 Heart Rate: 68 Resp: 18 SpO2: 98 % O2 Device: None (Room air)      Exam:  Unable to exam.    Recent Labs   Lab 11/07/19  0512 11/06/19  1307 11/06/19  0751 11/05/19  2140   WBC 10.4  --  12.8* 11.3*   HGB 14.8 14.6 14.9 15.2     --  188 182   CRP  --   --  64.0*  --      CK 19,500 <-- 19,000 <-- 44,000  Cr 9.3 <-- 9.9  WBC 10.4 <-- 12.8  ESR 17  CRP 64    Assessment: Sawyer Walters is a 29 year old male who presented with left gluteal region pain consistent with an evolved compartment syndrome.      The patient presented on 11/5/2019 with a 3-day history of left buttock/thigh pain that occurred after \"blacking out\" on his left side.  He reported that his pain was substantial on 11/3/2019 but has slightly improved since hospital admission.  He had been able to ambulate up until hospitalization.  In the context of an elevated CK of 44,000 on presentation, his complaints are consistent with evolved compartment syndrome.  Other possible diagnoses considered were myositis and hematoma. U/S was negative for any fluid collection to a depth of 10 cm and negative for DVT.  CK decreased to19,000 on 11/6/2019.       Currently, there is no objective evidence for active compartment syndrome.     Plan:  Medicine Primary  Activity: As tolerated  DVT prophylaxis: Mechanical from an Ortho standpoint  Imaging: Consider MRI LLE if symptoms fail to improve  Physical Therapy: Mobilization as tolerated  Diet: Okay for diet from an Ortho standpoint  Labs: Trend CK, CRP, Cr  Follow-up: Pending hospital course  Disposition: Pending hospital course    Orthopedic staff: Dr. Derick Frausto MD PhD  Orthopaedic Surgery PGY4  Pager [phone]    Please page me directly with " any questions/concerns during regular weekday hours before 5pm. If there is no response, if it is a weekend, or if it is during evening hours then please page the orthopaedic surgery resident on call.

## 2019-11-09 LAB
ALBUMIN SERPL-MCNC: 2.2 G/DL (ref 3.4–5)
ALP SERPL-CCNC: 66 U/L (ref 40–150)
ALT SERPL W P-5'-P-CCNC: 249 U/L (ref 0–70)
ANION GAP SERPL CALCULATED.3IONS-SCNC: 8 MMOL/L (ref 3–14)
AST SERPL W P-5'-P-CCNC: 188 U/L (ref 0–45)
BILIRUB SERPL-MCNC: 0.3 MG/DL (ref 0.2–1.3)
BUN SERPL-MCNC: 49 MG/DL (ref 7–30)
CALCIUM SERPL-MCNC: 8.2 MG/DL (ref 8.5–10.1)
CHLORIDE SERPL-SCNC: 101 MMOL/L (ref 94–109)
CK SERPL-CCNC: 4731 U/L (ref 30–300)
CO2 SERPL-SCNC: 24 MMOL/L (ref 20–32)
CREAT SERPL-MCNC: 8.16 MG/DL (ref 0.66–1.25)
GFR SERPL CREATININE-BSD FRML MDRD: 8 ML/MIN/{1.73_M2}
GLUCOSE SERPL-MCNC: 98 MG/DL (ref 70–99)
PHOSPHATE SERPL-MCNC: 5.2 MG/DL (ref 2.5–4.5)
POTASSIUM SERPL-SCNC: 4.6 MMOL/L (ref 3.4–5.3)
PROT SERPL-MCNC: 5.3 G/DL (ref 6.8–8.8)
SODIUM SERPL-SCNC: 133 MMOL/L (ref 133–144)

## 2019-11-09 PROCEDURE — 82550 ASSAY OF CK (CPK): CPT | Performed by: STUDENT IN AN ORGANIZED HEALTH CARE EDUCATION/TRAINING PROGRAM

## 2019-11-09 PROCEDURE — 99233 SBSQ HOSP IP/OBS HIGH 50: CPT | Performed by: STUDENT IN AN ORGANIZED HEALTH CARE EDUCATION/TRAINING PROGRAM

## 2019-11-09 PROCEDURE — 25000132 ZZH RX MED GY IP 250 OP 250 PS 637: Performed by: STUDENT IN AN ORGANIZED HEALTH CARE EDUCATION/TRAINING PROGRAM

## 2019-11-09 PROCEDURE — 12000001 ZZH R&B MED SURG/OB UMMC

## 2019-11-09 PROCEDURE — 25000128 H RX IP 250 OP 636: Performed by: STUDENT IN AN ORGANIZED HEALTH CARE EDUCATION/TRAINING PROGRAM

## 2019-11-09 PROCEDURE — 84100 ASSAY OF PHOSPHORUS: CPT | Performed by: STUDENT IN AN ORGANIZED HEALTH CARE EDUCATION/TRAINING PROGRAM

## 2019-11-09 PROCEDURE — 80053 COMPREHEN METABOLIC PANEL: CPT | Performed by: STUDENT IN AN ORGANIZED HEALTH CARE EDUCATION/TRAINING PROGRAM

## 2019-11-09 PROCEDURE — 36415 COLL VENOUS BLD VENIPUNCTURE: CPT | Performed by: STUDENT IN AN ORGANIZED HEALTH CARE EDUCATION/TRAINING PROGRAM

## 2019-11-09 RX ORDER — ACETAMINOPHEN 325 MG/1
975 TABLET ORAL EVERY 6 HOURS
Status: DISCONTINUED | OUTPATIENT
Start: 2019-11-09 | End: 2019-11-16 | Stop reason: HOSPADM

## 2019-11-09 RX ADMIN — HYDROMORPHONE HYDROCHLORIDE 0.5 MG: 1 INJECTION, SOLUTION INTRAMUSCULAR; INTRAVENOUS; SUBCUTANEOUS at 14:34

## 2019-11-09 RX ADMIN — HYDROMORPHONE HYDROCHLORIDE 0.5 MG: 1 INJECTION, SOLUTION INTRAMUSCULAR; INTRAVENOUS; SUBCUTANEOUS at 22:47

## 2019-11-09 RX ADMIN — LIDOCAINE 1 PATCH: 560 PATCH PERCUTANEOUS; TOPICAL; TRANSDERMAL at 21:16

## 2019-11-09 RX ADMIN — HYDROMORPHONE HYDROCHLORIDE 0.5 MG: 1 INJECTION, SOLUTION INTRAMUSCULAR; INTRAVENOUS; SUBCUTANEOUS at 01:14

## 2019-11-09 RX ADMIN — AMLODIPINE BESYLATE 5 MG: 5 TABLET ORAL at 09:02

## 2019-11-09 RX ADMIN — HYDROMORPHONE HYDROCHLORIDE 0.5 MG: 1 INJECTION, SOLUTION INTRAMUSCULAR; INTRAVENOUS; SUBCUTANEOUS at 09:02

## 2019-11-09 RX ADMIN — ACETAMINOPHEN 975 MG: 325 TABLET, FILM COATED ORAL at 11:24

## 2019-11-09 RX ADMIN — HYDROMORPHONE HYDROCHLORIDE 0.5 MG: 1 INJECTION, SOLUTION INTRAMUSCULAR; INTRAVENOUS; SUBCUTANEOUS at 05:23

## 2019-11-09 RX ADMIN — ACETAMINOPHEN 975 MG: 325 TABLET, FILM COATED ORAL at 17:35

## 2019-11-09 RX ADMIN — HYDROMORPHONE HYDROCHLORIDE 0.5 MG: 1 INJECTION, SOLUTION INTRAMUSCULAR; INTRAVENOUS; SUBCUTANEOUS at 18:39

## 2019-11-09 ASSESSMENT — ACTIVITIES OF DAILY LIVING (ADL)
ADLS_ACUITY_SCORE: 15

## 2019-11-09 ASSESSMENT — MIFFLIN-ST. JEOR
SCORE: 2126.1
SCORE: 2142.43

## 2019-11-09 NOTE — PROGRESS NOTES
HEMODIALYSIS TREATMENT NOTE    Date: 11/8/2019  Time: 7:23 PM    Data:  Pre Wt: 109.7 kg (241 lb 13.5 oz)   Desired Wt: 109.2 kg   Ultrafiltration - Post Run Net Total Removed (mL): 500 mL  Vascular Access Status: patent  Dialyzer Rinse: Streaked, Moderate  Total Blood Volume Processed: 47.8 L Liters  Total Dialysis (Treatment) Time: 3 Hours    Lab:   No    Interventions/Assessment:  Patient's third HD tx via RIJ catheter on K2 Ca2.5 dialysate bath with  and  per order. Keep SBP > 110. Heparin 500 units/hr used to avoid clotting HD lines. Numerous arterial pressure alarms with air noted in lines. Limbs flushed and lines reversed. Catheter very positional and patient reminded to minimize movement and talking. Nephrologist aware. Pt somewhat anxious but cooperative. 0.5 kg removed and CVC saline locked with clear guard caps. Post /88 and report given to primary RN.      Plan:    Next HD tx per renal team.

## 2019-11-09 NOTE — PLAN OF CARE
Patient A & O X 4, VSS no respiratory distress noted, c/o left hip pain and received scheduled medications and Dilaudid IV PRN. Post assessment with some relief. RIJ intact. Patient was NPO for HD catheter replacement . Order is pending until Monday and MD change diet to renal diet .  Pt  Void adequate in urinal. Patient on strict I/O. Patient up independently, went outside for smoke x 1 with family via wheelchair . Call light in reach, continue monitor closely and update MD with change.

## 2019-11-09 NOTE — PLAN OF CARE
Pt A&O. SBA-A1. Went down for dialysis at 1600 came back 1900. Pt with significant other and friends. New lidocaine patch given because old one fell off. Playing video games. Pt seems somewhat discouraged with dialysis. Will continue to monitor and follow POC.

## 2019-11-09 NOTE — PROGRESS NOTES
Patient has been educated on potential risks of choosing to leave the unit and that the responsibility for patient well-being will belong to the patient. Pt has been informed that admission to hospital is due to need for medical treatment. Education given to the patient on some of the potential risks included but are not limited to:      - lack of access to nursing intervention      - possible missed appointments with MD, therapies, tests      - possible missed medications, antibiotics, management of IV's    Patient Response : Okay                 Patient notified staff prior to leaving unit: yes  Coban wrap placed over IV prior to pt leaving unit No Pt had tight  sweate shirt. Patient instruct not  to stay to long outside , patient report understanding.

## 2019-11-09 NOTE — PLAN OF CARE
Pt's AVSS. C/O left hip pain requesting for dilaudid. IV dilaudid 0.5 mg given with relief. Offered tylenol , pt refused. Dialysis access on right chest, dressing clean and intact. Up with stand by assist. Will continue to monitor.

## 2019-11-09 NOTE — PROGRESS NOTES
[facility] Internal Medicine Progress  Note            Interval History:   ESTRELLITA, team notes reviewed   Sleeping comfortably   Notes continued left hip pain, requests review of pain plan - opioids were helping but now breaking through  Worried about renal function   Requesting more pain meds, not taking APAP            Assessment and Plan:   30 y/o M w/ERROL/AIN d/t rhabdomyolysis following 'black-out' d/t polysubstance abuse w/new left hip pain which started Sunday morning    # ACUTE RENAL FAILURE d/t RHABDOMYOLYSIS w/AIN - CK down-trending   # HYPONATREMIA - improved  # UREMIA -   # HYPERPHOSPHATEMIA   # NAGMA - resolved   # TRANSAMINITIS - improving   CK 44K w/anephric creatinine rise concerning for AIN induced renal failure. Now receiving RRT.   - Nephrology following   - RRT per nephrology, will challenge this weekend   - can consider discharge once requiring intermittent iHD   - Unclear at this point potential for recovery, will CTM  - IR consult for tunneled iHD line Monday   - Strict I/O  - RD consult for renal diet education/review       # HTN  Likely related to renal failure.   - Amlodipine 5mg daily       # MECHANICAL FALL   # ACUTE LEFT HIP PAIN   No fx on imaging. Concern for compartment syndrome. Evaluated by vascular and orthopedic surgery. No evidence of compartment syndrome. Appreciate surgeries expletives.    - Dilauded 0.5mg q4hrs PRN  - Tylenol  - lidocaine patch qAM      # LEUKOCYTOSIS - resolved  No evidence of infection. Likely reactive   - CTM      # POLYSUBSTANCE ABUSE   Tox screen + Amphetamines, Cannabinoids. Patient refusing to talk about it.   - consider CD consult tomorrow if pt willing       Diet: Renal  Fluids: PO  DVT: SCDs, caution w/anticoagulation given uremia/possibe hip hematoma, ambulatory   Code Status: Full      This patient was staffed with Dr. Shon Gaytan, the attending physician on service.     Aldair Solano IV, MD, MSc  Internal Medicine Resident, PGY3  [facility]  "[facility]   Pager [phone]            Objective:   BP (!) 141/88 (BP Location: Left arm)   Pulse 74   Temp 98.9  F (37.2  C) (Oral)   Resp 17   Ht 1.854 m (6' 1\")   Wt 109.7 kg (241 lb 14.4 oz)   SpO2 97%   BMI 31.91 kg/m      No intake or output data in the 24 hours ending 11/06/19 1314    PHYSICAL EXAMINATION   GEN: A&Ox3, in pain w/movement, pleasant, cooperative   CV: RRR - no m/r/g  LUNGS: CTAB, nwb  ABD: +BS, soft, NT/ND  EXT: wwp, no edema, hip tense, painful to palpation - improved from previous   NEURO: no FNDs      Labs, Imaging, & Medications:   All labs, images, and medications reviewed by me.           "

## 2019-11-09 NOTE — PROGRESS NOTES
Patient has been educated on potential risks of choosing to leave the unit and that the responsibility for patient well-being will belong to the patient. Pt has been informed that admission to hospital is due to need for medical treatment. Education given to the patient on some of the potential risks included but are not limited to:      - lack of access to nursing intervention      - possible missed appointments with MD, therapies, tests      - possible missed medications, antibiotics, management of IV's    Patient Response: Okay    Patient notified staff prior to leaving unit: Yes  Coban wrap placed over IV prior to pt leaving unit: No pt had on tight sweatshirt

## 2019-11-10 LAB
ANION GAP SERPL CALCULATED.3IONS-SCNC: 10 MMOL/L (ref 3–14)
BUN SERPL-MCNC: 66 MG/DL (ref 7–30)
CALCIUM SERPL-MCNC: 8.2 MG/DL (ref 8.5–10.1)
CHLORIDE SERPL-SCNC: 99 MMOL/L (ref 94–109)
CO2 SERPL-SCNC: 24 MMOL/L (ref 20–32)
CREAT SERPL-MCNC: 9.84 MG/DL (ref 0.66–1.25)
ERYTHROCYTE [DISTWIDTH] IN BLOOD BY AUTOMATED COUNT: 12.8 % (ref 10–15)
GFR SERPL CREATININE-BSD FRML MDRD: 6 ML/MIN/{1.73_M2}
GLUCOSE SERPL-MCNC: 100 MG/DL (ref 70–99)
HCT VFR BLD AUTO: 35.7 % (ref 40–53)
HGB BLD-MCNC: 12.3 G/DL (ref 13.3–17.7)
MCH RBC QN AUTO: 29.6 PG (ref 26.5–33)
MCHC RBC AUTO-ENTMCNC: 34.5 G/DL (ref 31.5–36.5)
MCV RBC AUTO: 86 FL (ref 78–100)
PLATELET # BLD AUTO: 167 10E9/L (ref 150–450)
POTASSIUM SERPL-SCNC: 5 MMOL/L (ref 3.4–5.3)
RBC # BLD AUTO: 4.16 10E12/L (ref 4.4–5.9)
SODIUM SERPL-SCNC: 133 MMOL/L (ref 133–144)
WBC # BLD AUTO: 11.7 10E9/L (ref 4–11)

## 2019-11-10 PROCEDURE — 80048 BASIC METABOLIC PNL TOTAL CA: CPT | Performed by: STUDENT IN AN ORGANIZED HEALTH CARE EDUCATION/TRAINING PROGRAM

## 2019-11-10 PROCEDURE — 25000128 H RX IP 250 OP 636: Performed by: STUDENT IN AN ORGANIZED HEALTH CARE EDUCATION/TRAINING PROGRAM

## 2019-11-10 PROCEDURE — 25000132 ZZH RX MED GY IP 250 OP 250 PS 637: Performed by: STUDENT IN AN ORGANIZED HEALTH CARE EDUCATION/TRAINING PROGRAM

## 2019-11-10 PROCEDURE — 99233 SBSQ HOSP IP/OBS HIGH 50: CPT | Performed by: STUDENT IN AN ORGANIZED HEALTH CARE EDUCATION/TRAINING PROGRAM

## 2019-11-10 PROCEDURE — 85027 COMPLETE CBC AUTOMATED: CPT | Performed by: STUDENT IN AN ORGANIZED HEALTH CARE EDUCATION/TRAINING PROGRAM

## 2019-11-10 PROCEDURE — 25000132 ZZH RX MED GY IP 250 OP 250 PS 637: Performed by: INTERNAL MEDICINE

## 2019-11-10 PROCEDURE — 36415 COLL VENOUS BLD VENIPUNCTURE: CPT | Performed by: STUDENT IN AN ORGANIZED HEALTH CARE EDUCATION/TRAINING PROGRAM

## 2019-11-10 PROCEDURE — 12000001 ZZH R&B MED SURG/OB UMMC

## 2019-11-10 RX ORDER — OXYCODONE HYDROCHLORIDE 5 MG/1
5-10 TABLET ORAL EVERY 6 HOURS PRN
Status: DISCONTINUED | OUTPATIENT
Start: 2019-11-10 | End: 2019-11-16 | Stop reason: HOSPADM

## 2019-11-10 RX ORDER — OXYCODONE HYDROCHLORIDE 5 MG/1
5 TABLET ORAL EVERY 6 HOURS PRN
Status: DISCONTINUED | OUTPATIENT
Start: 2019-11-10 | End: 2019-11-10

## 2019-11-10 RX ORDER — FUROSEMIDE 10 MG/ML
40 SOLUTION ORAL ONCE
Status: COMPLETED | OUTPATIENT
Start: 2019-11-10 | End: 2019-11-10

## 2019-11-10 RX ADMIN — OXYCODONE HYDROCHLORIDE 10 MG: 5 TABLET ORAL at 23:38

## 2019-11-10 RX ADMIN — ACETAMINOPHEN 975 MG: 325 TABLET, FILM COATED ORAL at 11:56

## 2019-11-10 RX ADMIN — LIDOCAINE 1 PATCH: 560 PATCH PERCUTANEOUS; TOPICAL; TRANSDERMAL at 21:41

## 2019-11-10 RX ADMIN — ACETAMINOPHEN 975 MG: 325 TABLET, FILM COATED ORAL at 04:28

## 2019-11-10 RX ADMIN — AMLODIPINE BESYLATE 5 MG: 5 TABLET ORAL at 09:32

## 2019-11-10 RX ADMIN — OXYCODONE HYDROCHLORIDE 10 MG: 5 TABLET ORAL at 10:19

## 2019-11-10 RX ADMIN — OXYCODONE HYDROCHLORIDE 10 MG: 5 TABLET ORAL at 16:36

## 2019-11-10 RX ADMIN — FUROSEMIDE 40 MG: 10 SOLUTION ORAL at 13:22

## 2019-11-10 RX ADMIN — ACETAMINOPHEN 975 MG: 325 TABLET, FILM COATED ORAL at 18:55

## 2019-11-10 RX ADMIN — HYDROMORPHONE HYDROCHLORIDE 0.5 MG: 1 INJECTION, SOLUTION INTRAMUSCULAR; INTRAVENOUS; SUBCUTANEOUS at 04:29

## 2019-11-10 ASSESSMENT — PAIN DESCRIPTION - DESCRIPTORS: DESCRIPTORS: INTERMITTENT

## 2019-11-10 ASSESSMENT — ACTIVITIES OF DAILY LIVING (ADL)
ADLS_ACUITY_SCORE: 14
ADLS_ACUITY_SCORE: 15
ADLS_ACUITY_SCORE: 12
ADLS_ACUITY_SCORE: 15
ADLS_ACUITY_SCORE: 14
ADLS_ACUITY_SCORE: 14

## 2019-11-10 ASSESSMENT — MIFFLIN-ST. JEOR: SCORE: 2156.04

## 2019-11-10 NOTE — PROGRESS NOTES
"                              Internal Medicine Progress Note - Gold Service  Sawyer Walters MRN: [id number]  Age: 29 year old, : 1990  Date: November 10, 2019         Interval History:     UOP improving. May not need HD tunneled line.   Doing well this AM, still has pain in the left thigh region. Otherwise optimistic and reprts that he is \"doing my best doc\".     Last 24 hr care team notes reviewed.     ROS:  4 point ROS including Respiratory, CV, GI and , is negative.   PHYSICAL EXAM    Vital signs:  Temp: 98  F (36.7  C) Temp src: Oral BP: (!) 140/66 Pulse: 73 Heart Rate: 65 Resp: 18 SpO2: 95 % O2 Device: None (Room air)   Height: 185.4 cm (6' 1\") Weight: 112.9 kg (248 lb 12.8 oz)  Estimated body mass index is 32.83 kg/m  as calculated from the following:    Height as of this encounter: 1.854 m (6' 1\").    Weight as of this encounter: 112.9 kg (248 lb 12.8 oz).      Intake/Output Summary (Last 24 hours) at 11/10/2019 0813  Last data filed at 11/10/2019 0659  Gross per 24 hour   Intake 710 ml   Output 1000 ml   Net -290 ml     UOP 1075  375     GEN: NAD, resting comfortably on exam, pleasant and cooperative , AAox3  HEENT: NC/AT , well injected conjunctiva, anicteric sclera, EOMI  Neck: trachea midline, no JVD - CVC in place   CV: RRR, nl S1/S2 no mumurs  PULM: CTAB  Abdomen: soft, non tender/distented , BS +   EXTREMITIES: warm, no pedal edema  SKIN: No rashes  NEURO: MS: AAOx3 - no focal deficit     DIAGNOSTIC STUDIES  I reviewed all medications, laboratory results, and imaging over the past 24 hours. Notable for;   ROUTINE LABS:   CR 9.84 (8.16)    MICROBIOLOGY  WBC 11.7  Hgb 12.3    IMAGING  None     Assessment and Plan:     30 y/o M w/ERROL/AIN d/t rhabdomyolysis following 'black-out' d/t polysubstance abuse w/new left hip pain which started  morning     #Plan for today:  - Stop IV dilaudid and transition to oral oxycodone     # ACUTE RENAL FAILURE (RIFLE III) d/t RHABDOMYOLYSIS w/AIN - CK " down-trending   # HYPONATREMIA - improved  # UREMIA -   # HYPERPHOSPHATEMIA   # NAGMA - resolved   # TRANSAMINITIS - improving     CK 44K w/anephric ERROL RIFLE stage III likely 2/2 ATN from rhabdomyolysis. Initially requiring RRT then iHD. Now kidney function showing signs of recovery.     - Nephrology following               - RRT per nephrology then iHD                - Unclear at this point potential for recovery, will CTM  - IR consult for tunneled iHD line Monday [Will hold off for now]  - Strict I/O  - RD consult for renal diet education/review      # HTN  Likely related to renal failure.   - Amlodipine 5mg daily      # MECHANICAL FALL   # ACUTE LEFT HIP PAIN   No fx on imaging. Concern for compartment syndrome. Evaluated by vascular and orthopedic surgery. No evidence of compartment syndrome. Appreciate surgeries expletives.      - Dilauded 0.5mg q4hrs PRN ---> change to oxycodone 5-10 mg q6h PRN   - Tylenol  - lidocaine patch qAM     # LEUKOCYTOSIS - resolved  No evidence of infection. Likely reactive   - CTM        # POLYSUBSTANCE ABUSE   Tox screen + Amphetamines, Cannabinoids. Patient wants time before he talks to chem dep. He reports that it was a one time incident where got too drunk.   - Will keep discussing CD consult when pt willing      Diet: Renal  Fluids: PO  DVT: SCDs, caution w/anticoagulation given uremia/possibe hip hematoma, ambulatory   Code Status: Full    Consulting Services:   Nephrology     Disposition: 2-3 days pending kidney function recovery. Expect patient will go back to prior living situation.     Lines:  PIVs     Patient care plan discussed beside with patient, RN, Patient`s girl friend at bedside.   Owen Gaytan MD  Internal Medicine Hospitalist & Staff Physician  [facility]  Pager: [phone]    Team: [facility]   Page Cross Cover between 5pm-8am: pager [phone] ([place]), if no response then page job code [phone].

## 2019-11-10 NOTE — PLAN OF CARE
Patient A & O X 4, c/o left leg and hip received scheduled medications and Oxycodone 10 mg po x1. Post assessment with relief. Patient slept for most the shift  and family at bedside . Patient appetite fair , up independently , void adequate in urinal. Patient resting in bed at this time . Call light in reach. Continue monitor I/O and update MD with change.

## 2019-11-10 NOTE — PLAN OF CARE
Left hip pain treated with scheduled APAP and Dilaudid once. Urine output was 375 and input around 100 ml. Independent with mobility. Right internal jugular is in place and locked. This dialysis catheter will be changed out on Monday. CK is trending down significantly from admission, but is still technically a critical level. Patient stated that UOP was the most that he has had since he was admitted. He is concerned about his kidney function. Slept and did not leave the unit during this shift. Continue with current plan of nursing care, and update MD with concerns as needed.

## 2019-11-10 NOTE — PROGRESS NOTES
Nephrology Progress Note  11/09/2019         Assessment & Recommendations:     Sawyer Walters is a 29 year old with history of polysubstance abuse admitted with rising creatinine and elevated CK level consistent with rhabdomyolysis.Patient reported blacking out and not remembering any recent events over the last 12 hours prior to seeking medical attention. Labs consistent with ERROL , Rhabdomyolysis      Acute renal failure secondary to rhabdomyolysis leading to ATN     Etiology : Most likely secondary to ATN in the setting of rhabdomyolysis  Urine did show WBC clumps but no granular casts.  FENA 2.8%  Creatinine has plateaued   Weight: 97.5 ( 11/5) --> 108.5 ( 11/7) --> 112.9 ( 112.9)  UOP : improving : 500 ml in last 24 hr    Hold HD today as it seems his renal function is recovering   We will still plan for tunnel HD cath on Monday .   However if over the weekend he continues to improve and does not need HD, then we may not need tunnelled cath      Also if he runs HD - he will require heparin maintenance to prevent clot     Blood pressure: controlled . He is euvolemic  Continue amlodipine 5 mg daily  Labetalol prn    Acidosis - resolved with HD    Electrolytes : mild hyponatremia , likely secondary to ERROL. Continue to monitor    Mild leukocytosis: Likely secondary to rhabdomyolysis. Resolved      Right hip pain, likely secondary to compartment syndrome with resultant rhabdomyolysis.  Being followed and managed by primary team and orthopedic surgery team.    Abnormal LFT , improving         Recommendations were communicated to primary team via note  Patient seen and discussed with Dr Natalie Mendieta MD  Nephrology Fellow   Pager [phone]  [facility]        Interval History :   Nursing and provider notes from last 24 hours reviewed.  In the last 24 hours Sawyer Walters has had improved UOP  Cr 9.85 ( 11/6) --> 9.33--> 8.80 --> 8.16  CK: 40K ( 11/5) --> 4k    Review of Systems:  "    Constitutional : No fever. Chills  Neuro: No confusion, headache, focal weakness  Chest: No SOB . No cough  Cardiovascular : No Chest pain   Extremities:  no  swelling  Gastrointestinal : No abdominal pain, nausea,vomiting. No diarrhea, constipation. No black stool .   : No flank pain , hematuria or voiding difficulty   Musculoskeletal: left hip pain       Physical Exam:   I/O last 3 completed shifts:  In: 600 [P.O.:600]  Out: 1075 [Urine:1075]   /76 (BP Location: Right arm)   Pulse 73   Temp 97  F (36.1  C) (Oral)   Resp 18   Ht 1.854 m (6' 1\")   Wt 112.9 kg (248 lb 12.8 oz)   SpO2 100%   BMI 32.83 kg/m       GENERAL APPEARANCE: no distress,  awake  Pulmonary: lungs clear to auscultation with equal breath sounds bilaterally, no clubbing  CV: regular rhythm, normal rate, no rub   - JVD    - Edema  1+ bilateral pedal edema   GI: soft, nontender, normal bowel sounds  MS: no evidence of inflammation in joints, no muscle tenderness  NEURO: face symmetric,no asterixis   Musculoskeletal : Tenderness in the left hip region, along the greater trochanter area  Extremities : distal CMS intact  Labs:   All labs reviewed by me  Electrolytes/Renal -   Recent Labs   Lab Test 11/09/19  0507 11/08/19 0637 11/07/19  0512    132* 130*   POTASSIUM 4.6 5.0 4.4   CHLORIDE 101 98 97   CO2 24 27 24   BUN 49* 49* 64*   CR 8.16* 8.80* 9.33*   GLC 98 94 106*   JOSTIN 8.2* 8.6 8.2*   PHOS 5.2* 4.8* 5.7*       CBC -   Recent Labs   Lab Test 11/07/19  0512 11/06/19  1307 11/06/19  0751 11/05/19  2140   WBC 10.4  --  12.8* 11.3*   HGB 14.8 14.6 14.9 15.2     --  188 182       LFTs -   Recent Labs   Lab Test 11/09/19  0507 11/08/19  0637 11/07/19  0512   ALKPHOS 66 73 79   BILITOTAL 0.3 0.5 0.5   * 306* 422*   * 302* 616*   PROTTOTAL 5.3* 5.8* 6.4*   ALBUMIN 2.2* 2.4* 2.6*       Iron Panel - No lab results found.      Imaging:  Pertinent imaging reviewed    Current Medications:    acetaminophen  975 mg " Oral Q6H     amLODIPine  5 mg Oral Daily     lidocaine  1 patch Transdermal Q24H     lidocaine   Transdermal Q8H     lidocaine   Transdermal Q24h     sodium chloride (PF)  3 mL Intracatheter Q8H       Anam Chaparro MD    I was present with the fellow during the history and exam.  I discussed the case with the fellow and agree with the findings as documented in the assessment and plan.  Katrin Estrada

## 2019-11-10 NOTE — PLAN OF CARE
Pt alert and oriented X 4. AVSS. C/O left leg pain with relief from prn IV dilaudid Q4H and scheduled tylenol. Up in room independently, went outside X 1 with significant other. High risk education given to patient. Pt verbalizes understanding. Strict intake and output. PIV saline locked. Will continue to monitor and follow plan of care.     Lidocaine patch applied on left hip at 2100.

## 2019-11-11 LAB
ANION GAP SERPL CALCULATED.3IONS-SCNC: 9 MMOL/L (ref 3–14)
BUN SERPL-MCNC: 79 MG/DL (ref 7–30)
CALCIUM SERPL-MCNC: 8.1 MG/DL (ref 8.5–10.1)
CHLORIDE SERPL-SCNC: 98 MMOL/L (ref 94–109)
CO2 SERPL-SCNC: 27 MMOL/L (ref 20–32)
CREAT SERPL-MCNC: 10.9 MG/DL (ref 0.66–1.25)
ERYTHROCYTE [DISTWIDTH] IN BLOOD BY AUTOMATED COUNT: 13 % (ref 10–15)
GFR SERPL CREATININE-BSD FRML MDRD: 6 ML/MIN/{1.73_M2}
GLUCOSE SERPL-MCNC: 102 MG/DL (ref 70–99)
HCT VFR BLD AUTO: 36.3 % (ref 40–53)
HGB BLD-MCNC: 12.1 G/DL (ref 13.3–17.7)
MCH RBC QN AUTO: 29.7 PG (ref 26.5–33)
MCHC RBC AUTO-ENTMCNC: 33.3 G/DL (ref 31.5–36.5)
MCV RBC AUTO: 89 FL (ref 78–100)
MPX SERIES: NONREACTIVE
PLATELET # BLD AUTO: 183 10E9/L (ref 150–450)
POTASSIUM SERPL-SCNC: 5.2 MMOL/L (ref 3.4–5.3)
RBC # BLD AUTO: 4.08 10E12/L (ref 4.4–5.9)
SODIUM SERPL-SCNC: 135 MMOL/L (ref 133–144)
WBC # BLD AUTO: 10.6 10E9/L (ref 4–11)

## 2019-11-11 PROCEDURE — 85027 COMPLETE CBC AUTOMATED: CPT | Performed by: STUDENT IN AN ORGANIZED HEALTH CARE EDUCATION/TRAINING PROGRAM

## 2019-11-11 PROCEDURE — 25000132 ZZH RX MED GY IP 250 OP 250 PS 637: Performed by: STUDENT IN AN ORGANIZED HEALTH CARE EDUCATION/TRAINING PROGRAM

## 2019-11-11 PROCEDURE — 12000001 ZZH R&B MED SURG/OB UMMC

## 2019-11-11 PROCEDURE — 25000128 H RX IP 250 OP 636: Performed by: INTERNAL MEDICINE

## 2019-11-11 PROCEDURE — 99233 SBSQ HOSP IP/OBS HIGH 50: CPT | Mod: GC | Performed by: STUDENT IN AN ORGANIZED HEALTH CARE EDUCATION/TRAINING PROGRAM

## 2019-11-11 PROCEDURE — 90937 HEMODIALYSIS REPEATED EVAL: CPT

## 2019-11-11 PROCEDURE — 80048 BASIC METABOLIC PNL TOTAL CA: CPT | Performed by: STUDENT IN AN ORGANIZED HEALTH CARE EDUCATION/TRAINING PROGRAM

## 2019-11-11 PROCEDURE — 25800030 ZZH RX IP 258 OP 636: Performed by: INTERNAL MEDICINE

## 2019-11-11 PROCEDURE — 36415 COLL VENOUS BLD VENIPUNCTURE: CPT | Performed by: STUDENT IN AN ORGANIZED HEALTH CARE EDUCATION/TRAINING PROGRAM

## 2019-11-11 RX ORDER — HEPARIN SODIUM 1000 [USP'U]/ML
500 INJECTION, SOLUTION INTRAVENOUS; SUBCUTANEOUS CONTINUOUS
Status: DISCONTINUED | OUTPATIENT
Start: 2019-11-11 | End: 2019-11-11

## 2019-11-11 RX ORDER — HEPARIN SODIUM 1000 [USP'U]/ML
500 INJECTION, SOLUTION INTRAVENOUS; SUBCUTANEOUS
Status: COMPLETED | OUTPATIENT
Start: 2019-11-11 | End: 2019-11-11

## 2019-11-11 RX ADMIN — ACETAMINOPHEN 975 MG: 325 TABLET, FILM COATED ORAL at 12:13

## 2019-11-11 RX ADMIN — HEPARIN SODIUM 500 UNITS/HR: 1000 INJECTION INTRAVENOUS; SUBCUTANEOUS at 14:30

## 2019-11-11 RX ADMIN — ACETAMINOPHEN 975 MG: 325 TABLET, FILM COATED ORAL at 19:27

## 2019-11-11 RX ADMIN — SODIUM CHLORIDE 250 ML: 9 INJECTION, SOLUTION INTRAVENOUS at 14:30

## 2019-11-11 RX ADMIN — AMLODIPINE BESYLATE 5 MG: 5 TABLET ORAL at 07:54

## 2019-11-11 RX ADMIN — HEPARIN SODIUM 500 UNITS: 1000 INJECTION INTRAVENOUS; SUBCUTANEOUS at 14:30

## 2019-11-11 RX ADMIN — OXYCODONE HYDROCHLORIDE 10 MG: 5 TABLET ORAL at 06:52

## 2019-11-11 RX ADMIN — ACETAMINOPHEN 975 MG: 325 TABLET, FILM COATED ORAL at 05:20

## 2019-11-11 RX ADMIN — OXYCODONE HYDROCHLORIDE 10 MG: 5 TABLET ORAL at 20:24

## 2019-11-11 RX ADMIN — SODIUM CHLORIDE 300 ML: 9 INJECTION, SOLUTION INTRAVENOUS at 14:30

## 2019-11-11 RX ADMIN — OXYCODONE HYDROCHLORIDE 10 MG: 5 TABLET ORAL at 13:55

## 2019-11-11 RX ADMIN — ACETAMINOPHEN 975 MG: 325 TABLET, FILM COATED ORAL at 00:58

## 2019-11-11 ASSESSMENT — ACTIVITIES OF DAILY LIVING (ADL)
ADLS_ACUITY_SCORE: 11
ADLS_ACUITY_SCORE: 12
ADLS_ACUITY_SCORE: 12

## 2019-11-11 ASSESSMENT — MIFFLIN-ST. JEOR
SCORE: 2142.43
SCORE: 2142.88

## 2019-11-11 ASSESSMENT — PAIN DESCRIPTION - DESCRIPTORS
DESCRIPTORS: INTERMITTENT;SORE
DESCRIPTORS: ACHING;INTERMITTENT

## 2019-11-11 NOTE — PROVIDER NOTIFICATION
Paged to Ting Cali @ [phone]:      Pt c/o new onset LLE swelling. No abnormalities noted except +1/+2 edema. C/o pain w/swelling. Any recommendations?    PEDRO LUIS Brock, [id number]

## 2019-11-11 NOTE — PROGRESS NOTES
Patient has been educated on potential risks of choosing to leave the unit and that the responsibility for patient well-being will belong to the patient. Pt has been informed that admission to hospital is due to need for medical treatment. Education given to the patient on some of the potential risks included but are not limited to:      - lack of access to nursing intervention      - possible missed appointments with MD, therapies, tests      - possible missed medications, antibiotics, management of IV's    Patient Response: yes     Patient notified staff prior to leaving unit: yes  Coban wrap placed over IV prior to pt leaving unit yes.

## 2019-11-11 NOTE — PROGRESS NOTES
Nephrology Progress Note  11/10/2019         Assessment & Recommendations:     Sawyer Walters is a 29 year old with history of polysubstance abuse admitted with rising creatinine and elevated CK level consistent with rhabdomyolysis.Patient reported blacking out and not remembering any recent events over the last 12 hours prior to seeking medical attention. Labs consistent with ERROL , Rhabdomyolysis      Acute renal failure secondary to rhabdomyolysis leading to ATN     Etiology : Most likely secondary to ATN in the setting of rhabdomyolysis  Urine did show WBC clumps but no granular casts.  FENA 2.8%  Creatinine has increased again today   8.16 --> 9.84  Weight: 97.5 ( 11/5) --> 108.5 ( 11/7) --> 112.9 ( 112.9)--> 114.2  UOP : improving :1075 ml in last 24 hr      His weight has gone up and creatinine has also bumped up slightly.  However his urine output has improved.  Has passed 107 5 mL in the last 24 hours.  We will hold HD today and reassess him tomorrow for need for hemodialysis.Also if he runs HD - he will require heparin maintenance to prevent clot     Based on clinical evaluation tomorrow and his renal panel we will take a decision as to whether to pursue tunneled catheter Monday morning.  We will communicate with primary team on Monday morning in this regards.    Blood pressure: controlled .Continue amlodipine 5 mg daily,Labetalol prn    Volume status :  He is euvolemic      Acidosis - resolved with HD    Electrolytes : mild hyponatremia , likely secondary to ERROL. Continue to monitor    Mild leukocytosis: Likely secondary to rhabdomyolysis. Resolved      Right hip pain, likely secondary to compartment syndrome with resultant rhabdomyolysis.  Being followed and managed by primary team and orthopedic surgery team. CK 4.7K    Abnormal LFT , improving      Plan of care discussed with patient.  Recommendations were communicated to primary team via note  Patient seen and discussed with   "Natalie Mendieta MD  Nephrology Fellow   Pager [phone]  [facility]        Interval History :   Nursing and provider notes from last 24 hours reviewed.  In the last 24 hours Sawyer Jamie Walters has had improved UOP    Review of Systems:     Constitutional : No fever. Chills  Neuro: No confusion, headache, focal weakness  Chest: No SOB . No cough  Cardiovascular : No Chest pain   Extremities: Positive for leg swelling  Gastrointestinal : No abdominal pain, nausea,vomiting. No diarrhea, constipation. No black stool .   : No flank pain , hematuria or voiding difficulty   Musculoskeletal: left hip pain       Physical Exam:   I/O last 3 completed shifts:  In: 450 [P.O.:440; I.V.:10]  Out: 1275 [Urine:1275]   BP (!) 147/73 (BP Location: Right arm)   Pulse 68   Temp 96.8  F (36  C) (Oral)   Resp 16   Ht 1.854 m (6' 1\")   Wt 114.2 kg (251 lb 12.8 oz)   SpO2 96%   BMI 33.22 kg/m       GENERAL APPEARANCE: no distress,  awake  Pulmonary: lungs clear to auscultation with equal breath sounds bilaterally, no clubbing  CV: regular rhythm, normal rate, no rub   - JVD    - Edema  1+ bilateral pedal edema   GI: soft, nontender, normal bowel sounds  MS: no evidence of inflammation in joints, no muscle tenderness  NEURO: face symmetric,no asterixis   Musculoskeletal : Tenderness in the left hip region, along the greater trochanter area  Extremities : distal CMS intact  Labs:   All labs reviewed by me  Electrolytes/Renal -   Recent Labs   Lab Test 11/10/19  0545 11/09/19  0507 11/08/19  0637 11/07/19  0512    133 132* 130*   POTASSIUM 5.0 4.6 5.0 4.4   CHLORIDE 99 101 98 97   CO2 24 24 27 24   BUN 66* 49* 49* 64*   CR 9.84* 8.16* 8.80* 9.33*   * 98 94 106*   JOSTIN 8.2* 8.2* 8.6 8.2*   PHOS  --  5.2* 4.8* 5.7*       CBC -   Recent Labs   Lab Test 11/10/19  0545 11/07/19  0512 11/06/19  1307 11/06/19  0751   WBC 11.7* 10.4  --  12.8*   HGB 12.3* 14.8 14.6 14.9    209  --  188       LFTs - "   Recent Labs   Lab Test 11/09/19  0507 11/08/19  0637 11/07/19  0512   ALKPHOS 66 73 79   BILITOTAL 0.3 0.5 0.5   * 306* 422*   * 302* 616*   PROTTOTAL 5.3* 5.8* 6.4*   ALBUMIN 2.2* 2.4* 2.6*       Iron Panel - No lab results found.      Imaging:  Pertinent imaging reviewed    Current Medications:    acetaminophen  975 mg Oral Q6H     amLODIPine  5 mg Oral Daily     lidocaine  1 patch Transdermal Q24H     lidocaine   Transdermal Q8H     lidocaine   Transdermal Q24h     sodium chloride (PF)  3 mL Intracatheter Q8H       Anam Chaparro MD      I was present with the fellow during the history and exam.  I discussed the case with the fellow and agree with the findings as documented in the assessment and plan.  Katrin Estrada

## 2019-11-11 NOTE — PLAN OF CARE
Pt A&O, VSS on RA. Up independently. Left unit for 2 hrs this evening. L hip/flank/thigh pain partially controlled with prn oxy and lidocaine patch. Skin is firm at site of fall. Plan for HD tomorrow. IR consulted for possible HD cath placement. R internal jugular CDI. PIV SL. LBM yesterday, good UO. Tolerating diet. Creat 9.8. Renal US normal. Plan to discharge when kidneys at baseline.

## 2019-11-11 NOTE — PROGRESS NOTES
"Nephrology Hemodialysis Progress Note  11/11/2019    We are continuing to manage Sawyer Walters for dialysis needs. Notes reviewed. Patient is seen on hemodialysis today.     Patient Active Problem List   Diagnosis     Rhabdomyolysis       BP (!) 140/85 (BP Location: Left arm)   Pulse 68   Temp 97.6  F (36.4  C) (Oral)   Resp 16   Ht 1.854 m (6' 1\")   Wt 112.9 kg (248 lb 12.8 oz)   SpO2 96%   BMI 32.83 kg/m    Gen: No apparent distress, seen on HD lying in bed.   HEENT: NCAT, anicteric MMM no lesions on the oropharynx. Dentition in good repair.  Neck: No LAD no significant JVD.  Chest: Clear to auscultation, no wheezing, crackles, or rales. No increased work of breathing.  CV: nl S1/S2, no M/R/G. + LE edema  Abd: Soft, NDNT, no rebound, masses or guarding. No HSM.  Ext: Nl tone and bulk, no rashes or lesions  Neuro: AxO, CN II-XII grossly intact  Dialysis Access:     Labs and imaging reviewed.    Assessment/Plan:  Diagnosis:Alexandru needing dialysis  Rhabdomyolysis - overall improving clinical status     Will continue to with maintenance dialysis with goal of 2-3 L as tolerated to maintain a MAP > 65  --temporary catheter malfunctioning at this time, likely positional   --okay to put in tunneled catheter for now versus exchanging the current for a temporary one. Renal recovery is still expected however can take up to 3-4 weeks in patients with ALEXANDRU assuming he started with normal kidney function,   Next HD as needed depending on renal recovery   "

## 2019-11-11 NOTE — CONSULTS
Patient is on IR schedule 11/12/2019 for a Double lumen large bore tunneled central line placement for dialysis    Labs ordered for procedure.   Orders for NPO, scrubs and antibiotics have been entered  Renal failure requiring long term dialysis, Nephrology to remove temporary dialysis catheter after dialysis, scrubs ordered to follow [phone] informed    Consent will be done prior to procedure.   Please contact the IR charge RN at [phone] for estimated time of procedure.     Discussed with Dr. Rodríguez Holland IR RPA  [phone] Call pager  [phone] pager            Discussed with Dr. Rodríguez Holland IR RPA  [phone] Call pager  [phone] pager

## 2019-11-11 NOTE — PROGRESS NOTES
HEMODIALYSIS TREATMENT NOTE    Date: 11/11/2019  Time: 1730    Data:  Pre Wt: 112.9 kg  Desired Wt: 109.9 kg   Post Wt: 110 kg  Weight change: -2.9 kg  Ultrafiltration - Post Run Net Total Removed (mL): 3000 mL  Vascular Access Status: patent  Dialyzer Rinse: light  Total Blood Volume Processed: 42.54 L  Total Dialysis (Treatment) Time: 3 hours     Lab: No    Assessment/Interventions:  3 hour HD run via right internal jugular CVC.  500 units Heparin given followed by 500 units/hr.  Blood flow decreased 2/2 high arterial pressures. Catheter very positional.  Pt had a difficult time laying on his side.  Blood flow 250-200 mL/min.  3 L removed.  Pt slightly hypertensive throughout run.  Pt slept through most of run with his significant other in his bed.  Report called.       Plan:  Continue with plan of care.  Tunneled catheter to be placed tomorrow.  Next run per Renal Team.

## 2019-11-11 NOTE — PLAN OF CARE
Pt AOx4, VSS on RA except hypertensive. C/o LLE pain & L hip pain, given oxycodone, scheduled tylenol, & cold packs w/some relief. C/o new onset LLE swelling, MD notified.   Up ad chiara. Plan for dialysis later today. RIJ CDI. RPIV S.L. Dialysis diet, tolerating well. Able to use call light appropriately & make needs known. Continue to monitor & follow POC.     Patient has been educated on potential risks of choosing to leave the unit and that the responsibility for patient well-being will belong to the patient. Pt has been informed that admission to hospital is due to need for medical treatment. Education given to the patient on some of the potential risks included but are not limited to:      - lack of access to nursing intervention      - possible missed appointments with MD, therapies, tests      - possible missed medications, antibiotics, management of IV's    Patient Response: okay     Patient notified staff prior to leaving unit: yes   Coban wrap placed over IV prior to pt leaving unit: yes

## 2019-11-11 NOTE — PROGRESS NOTES
[facility], [place]    Progress Note - Ting Cabello Service        Date of Admission:  11/5/2019    Assessment & Plan   30 y/o M w/ERROL/AIN d/t rhabdomyolysis following 'black-out' d/t polysubstance abuse w/new left hip pain which started Sunday morning.     Active issues as of 11/11 include ongoing renal failure which is improving (non oliguric).     Changes today   - Planning with nephrology regarding OP hemodialsis. Our hope is to avoid tunneled line given that he has no insurance and monitor for renal recovery if at all able. Appreciate input.   - Encouraged pt to discuss with financial counselor; he is willing today.   - check CK in AM tomorrow for pt peace of mind.     ACUTE RENAL FAILURE (RIFLE III) d/t RHABDOMYOLYSIS w/AIN   CK 44K w/anephric ERROL RIFLE stage III likely 2/2 ATN from rhabdomyolysis. Initially requiring RRT then iHD. Now kidney function showing signs of recovery and urine output appropriate. No insurance thus OP hemodialysis will be difficult to arrange. Notably urine output improving.   - Nephrology following              - RRT per nephrology then iHD              - Unclear at this point potential for recovery, will CTM  - Strict I/O     HTN  Likely related to renal failure.   - Amlodipine 5mg daily      MECHANICAL FALL   ACUTE LEFT HIP PAIN   No fx on imaging. Concern for compartment syndrome. Evaluated by vascular and orthopedic surgery. No evidence of compartment syndrome. Appreciate surgeries expletives.    - Oxycodone 5-10 mg q6h PRN   - Scheduled Tylenol  - Lidocaine patch qAM  - IF need be ok for 1-2 one time dose of oxycodone if unable to sleep however will attempt to minimize as able.     POLYSUBSTANCE ABUSE   Tox screen + Amphetamines, Cannabinoids. Patient wants time before he talks to chem dep. He reports that it was a one time incident where got too drunk.   Continues to decline as of 11/11      Diet: Room Service  Dialysis Diet    Fluids: None, po  intake   Lines: HD line and PIV   DVT Prophylaxis: Heparin SQ  Jaramillo Catheter: not present  Code Status: Full Code      Disposition Plan   Expected discharge: 4 - 7 days, recommended to prior living arrangement once decision schroeder regarding HD as OP and ongoing improvement in renal  function .  Entered: Suzette Abernathy MD 11/11/2019, 10:02 AM     The patient's care was discussed with the Attending Physician, Dr. Love and Patient.    Suzette Abernathy MD  [address], [place]  Pager: [phone]  Please see sticky note for cross cover information  ______________________________________________________________________    Interval History   No acute events overnight. Patient stating     Data reviewed today: I reviewed all medications, new labs and imaging results over the last 24 hours. I personally reviewed no images or EKG's today.    Physical Exam   Vital Signs: Temp: 97.6  F (36.4  C) Temp src: Oral BP: (!) 140/85 Pulse: 68 Heart Rate: 73 Resp: 16 SpO2: 96 % O2 Device: None (Room air)    Weight: 248 lbs 12.8 oz  General Appearance: Well appearing male, laughing, playing video gme with girlfriend. Sitting in chair   Respiratory: Deferred   Cardiovascular: Deferred   GI: Deferred   Skin: Lower extremities without rash however left lower extremity is warm, thigh is tense and lateral aspect of  thigh is tender to touch.   Other: Alert, oriented, mentating.

## 2019-11-12 ENCOUNTER — APPOINTMENT (OUTPATIENT)
Dept: INTERVENTIONAL RADIOLOGY/VASCULAR | Facility: CLINIC | Age: 29
DRG: 557 | End: 2019-11-12
Attending: RADIOLOGY

## 2019-11-12 LAB
ACETAMINOPHEN QUAL: NEGATIVE
AMOBARBITAL QUAL: NEGATIVE
ANION GAP SERPL CALCULATED.3IONS-SCNC: 10 MMOL/L (ref 3–14)
BARBITAL QUAL: NEGATIVE
BUN SERPL-MCNC: 59 MG/DL (ref 7–30)
BUTABARBITAL QUAL: NEGATIVE
BUTALBITAL QUAL: NEGATIVE
CAFFEINE QUAL: NEGATIVE
CALCIUM SERPL-MCNC: 8.8 MG/DL (ref 8.5–10.1)
CARBAMAZEPINE QUAL: NEGATIVE
CARISOPRODOL QUAL: NEGATIVE
CHLORIDE SERPL-SCNC: 98 MMOL/L (ref 94–109)
CHLORPROPAMIDE UR-MCNC: NEGATIVE UG/ML
CK SERPL-CCNC: 1439 U/L (ref 30–300)
CO2 SERPL-SCNC: 24 MMOL/L (ref 20–32)
CREAT SERPL-MCNC: 8.4 MG/DL (ref 0.66–1.25)
DRUGS SERPL SCN: NEGATIVE
ERYTHROCYTE [DISTWIDTH] IN BLOOD BY AUTOMATED COUNT: 13 % (ref 10–15)
ETHCLORVYNOL QUAL: NEGATIVE
ETHINAMATE QUAL: NEGATIVE
ETHOSUXIMIDE QUAL: NEGATIVE
ETHOTOIN QUAL: NEGATIVE
GFR SERPL CREATININE-BSD FRML MDRD: 8 ML/MIN/{1.73_M2}
GLUCOSE SERPL-MCNC: 93 MG/DL (ref 70–99)
GLUTETHIMIDE QUAL: NEGATIVE
HCT VFR BLD AUTO: 37.6 % (ref 40–53)
HGB BLD-MCNC: 12.4 G/DL (ref 13.3–17.7)
IBUPROFEN QUAL: NEGATIVE
INR PPP: 1 (ref 0.86–1.14)
MCH RBC QN AUTO: 29.2 PG (ref 26.5–33)
MCHC RBC AUTO-ENTMCNC: 33 G/DL (ref 31.5–36.5)
MCV RBC AUTO: 89 FL (ref 78–100)
MEPHENYTOIN QUAL: NEGATIVE
MEPHOBARBITAL QUAL: NEGATIVE
MEPROBAMATE QUAL: NEGATIVE
METHAQUALONE QUAL: NEGATIVE
METHARBITAL QUAL: NEGATIVE
METHSUXIMIDE QUAL: NEGATIVE
METHYPRYLON QUAL: NEGATIVE
PENTOBARBITAL QUAL: NEGATIVE
PHENACETIN QUAL: NEGATIVE
PHENOBARBITAL QUAL: NEGATIVE
PHENSUXIMIDE QUAL: NEGATIVE
PHENYTOIN QUAL: NEGATIVE
PLATELET # BLD AUTO: 224 10E9/L (ref 150–450)
POTASSIUM SERPL-SCNC: 5.6 MMOL/L (ref 3.4–5.3)
POTASSIUM SERPL-SCNC: 5.6 MMOL/L (ref 3.4–5.3)
PRIMIDONE QUAL: NEGATIVE
RBC # BLD AUTO: 4.25 10E12/L (ref 4.4–5.9)
SALICYLATE QUAL: NEGATIVE
SECOBARBITAL QUAL: NEGATIVE
SODIUM SERPL-SCNC: 133 MMOL/L (ref 133–144)
TALBUTAL QUAL: NEGATIVE
THEOPHYLLINE QUAL: NEGATIVE
THIOPENTAL QUAL: NEGATIVE
TYBAMATE QUAL: NEGATIVE
VALPROIC ACID QUAL: NEGATIVE
WBC # BLD AUTO: 12.4 10E9/L (ref 4–11)

## 2019-11-12 PROCEDURE — 27210732 ZZH ACCESSORY CR1

## 2019-11-12 PROCEDURE — 27210908 ZZH NEEDLE CR4

## 2019-11-12 PROCEDURE — 99233 SBSQ HOSP IP/OBS HIGH 50: CPT | Mod: GC | Performed by: INTERNAL MEDICINE

## 2019-11-12 PROCEDURE — C1750 CATH, HEMODIALYSIS,LONG-TERM: HCPCS

## 2019-11-12 PROCEDURE — 84132 ASSAY OF SERUM POTASSIUM: CPT | Performed by: STUDENT IN AN ORGANIZED HEALTH CARE EDUCATION/TRAINING PROGRAM

## 2019-11-12 PROCEDURE — 25000128 H RX IP 250 OP 636: Performed by: PHYSICIAN ASSISTANT

## 2019-11-12 PROCEDURE — 93005 ELECTROCARDIOGRAM TRACING: CPT

## 2019-11-12 PROCEDURE — 25000128 H RX IP 250 OP 636: Performed by: INTERNAL MEDICINE

## 2019-11-12 PROCEDURE — 36415 COLL VENOUS BLD VENIPUNCTURE: CPT | Performed by: STUDENT IN AN ORGANIZED HEALTH CARE EDUCATION/TRAINING PROGRAM

## 2019-11-12 PROCEDURE — 02H633Z INSERTION OF INFUSION DEVICE INTO RIGHT ATRIUM, PERCUTANEOUS APPROACH: ICD-10-PCS | Performed by: PHYSICIAN ASSISTANT

## 2019-11-12 PROCEDURE — 25000132 ZZH RX MED GY IP 250 OP 250 PS 637: Performed by: STUDENT IN AN ORGANIZED HEALTH CARE EDUCATION/TRAINING PROGRAM

## 2019-11-12 PROCEDURE — 36558 INSERT TUNNELED CV CATH: CPT | Mod: LT

## 2019-11-12 PROCEDURE — C1769 GUIDE WIRE: HCPCS

## 2019-11-12 PROCEDURE — 90937 HEMODIALYSIS REPEATED EVAL: CPT

## 2019-11-12 PROCEDURE — 0JH63XZ INSERTION OF TUNNELED VASCULAR ACCESS DEVICE INTO CHEST SUBCUTANEOUS TISSUE AND FASCIA, PERCUTANEOUS APPROACH: ICD-10-PCS | Performed by: PHYSICIAN ASSISTANT

## 2019-11-12 PROCEDURE — 36415 COLL VENOUS BLD VENIPUNCTURE: CPT | Performed by: RADIOLOGY

## 2019-11-12 PROCEDURE — 93010 ELECTROCARDIOGRAM REPORT: CPT | Performed by: INTERNAL MEDICINE

## 2019-11-12 PROCEDURE — 80048 BASIC METABOLIC PNL TOTAL CA: CPT | Performed by: STUDENT IN AN ORGANIZED HEALTH CARE EDUCATION/TRAINING PROGRAM

## 2019-11-12 PROCEDURE — 82550 ASSAY OF CK (CPK): CPT | Performed by: STUDENT IN AN ORGANIZED HEALTH CARE EDUCATION/TRAINING PROGRAM

## 2019-11-12 PROCEDURE — 25800030 ZZH RX IP 258 OP 636: Performed by: INTERNAL MEDICINE

## 2019-11-12 PROCEDURE — 85610 PROTHROMBIN TIME: CPT | Performed by: RADIOLOGY

## 2019-11-12 PROCEDURE — 40000141 ZZH STATISTIC PERIPHERAL IV START W/O US GUIDANCE

## 2019-11-12 PROCEDURE — 27211193 ZZ H WOUND GLUE CR1

## 2019-11-12 PROCEDURE — 85027 COMPLETE CBC AUTOMATED: CPT | Performed by: RADIOLOGY

## 2019-11-12 PROCEDURE — 80307 DRUG TEST PRSMV CHEM ANLYZR: CPT | Performed by: RADIOLOGY

## 2019-11-12 PROCEDURE — 76937 US GUIDE VASCULAR ACCESS: CPT

## 2019-11-12 PROCEDURE — 99152 MOD SED SAME PHYS/QHP 5/>YRS: CPT

## 2019-11-12 PROCEDURE — 12000001 ZZH R&B MED SURG/OB UMMC

## 2019-11-12 RX ORDER — NICOTINE POLACRILEX 4 MG
15-30 LOZENGE BUCCAL
Status: DISCONTINUED | OUTPATIENT
Start: 2019-11-12 | End: 2019-11-12 | Stop reason: HOSPADM

## 2019-11-12 RX ORDER — HEPARIN SODIUM 1000 [USP'U]/ML
3 INJECTION, SOLUTION INTRAVENOUS; SUBCUTANEOUS ONCE
Status: COMPLETED | OUTPATIENT
Start: 2019-11-12 | End: 2019-11-12

## 2019-11-12 RX ORDER — DEXTROSE MONOHYDRATE 25 G/50ML
25-50 INJECTION, SOLUTION INTRAVENOUS
Status: CANCELLED | OUTPATIENT
Start: 2019-11-12

## 2019-11-12 RX ORDER — SENNOSIDES 8.6 MG
8.6 TABLET ORAL 2 TIMES DAILY PRN
Status: DISCONTINUED | OUTPATIENT
Start: 2019-11-12 | End: 2019-11-16 | Stop reason: HOSPADM

## 2019-11-12 RX ORDER — DEXTROSE MONOHYDRATE 25 G/50ML
25 INJECTION, SOLUTION INTRAVENOUS ONCE
Status: DISCONTINUED | OUTPATIENT
Start: 2019-11-12 | End: 2019-11-16 | Stop reason: HOSPADM

## 2019-11-12 RX ORDER — NICOTINE POLACRILEX 4 MG
15-30 LOZENGE BUCCAL
Status: DISCONTINUED | OUTPATIENT
Start: 2019-11-12 | End: 2019-11-16 | Stop reason: HOSPADM

## 2019-11-12 RX ORDER — FLUMAZENIL 0.1 MG/ML
0.2 INJECTION, SOLUTION INTRAVENOUS
Status: DISCONTINUED | OUTPATIENT
Start: 2019-11-12 | End: 2019-11-12 | Stop reason: HOSPADM

## 2019-11-12 RX ORDER — CEFAZOLIN SODIUM 2 G/100ML
2 INJECTION, SOLUTION INTRAVENOUS
Status: COMPLETED | OUTPATIENT
Start: 2019-11-12 | End: 2019-11-12

## 2019-11-12 RX ORDER — FENTANYL CITRATE 50 UG/ML
25-50 INJECTION, SOLUTION INTRAMUSCULAR; INTRAVENOUS EVERY 5 MIN PRN
Status: DISCONTINUED | OUTPATIENT
Start: 2019-11-12 | End: 2019-11-12 | Stop reason: HOSPADM

## 2019-11-12 RX ORDER — NICOTINE POLACRILEX 4 MG
15-30 LOZENGE BUCCAL
Status: CANCELLED | OUTPATIENT
Start: 2019-11-12

## 2019-11-12 RX ORDER — ONDANSETRON 2 MG/ML
4-8 INJECTION INTRAMUSCULAR; INTRAVENOUS EVERY 6 HOURS PRN
Status: DISCONTINUED | OUTPATIENT
Start: 2019-11-12 | End: 2019-11-16 | Stop reason: HOSPADM

## 2019-11-12 RX ORDER — NALOXONE HYDROCHLORIDE 0.4 MG/ML
.1-.4 INJECTION, SOLUTION INTRAMUSCULAR; INTRAVENOUS; SUBCUTANEOUS
Status: DISCONTINUED | OUTPATIENT
Start: 2019-11-12 | End: 2019-11-12 | Stop reason: HOSPADM

## 2019-11-12 RX ORDER — POLYETHYLENE GLYCOL 3350 17 G/17G
17 POWDER, FOR SOLUTION ORAL DAILY
Status: DISCONTINUED | OUTPATIENT
Start: 2019-11-12 | End: 2019-11-16 | Stop reason: HOSPADM

## 2019-11-12 RX ORDER — DEXTROSE MONOHYDRATE 100 MG/ML
INJECTION, SOLUTION INTRAVENOUS CONTINUOUS
Status: DISPENSED | OUTPATIENT
Start: 2019-11-12 | End: 2019-11-12

## 2019-11-12 RX ORDER — DEXTROSE MONOHYDRATE 25 G/50ML
25-50 INJECTION, SOLUTION INTRAVENOUS
Status: DISCONTINUED | OUTPATIENT
Start: 2019-11-12 | End: 2019-11-12 | Stop reason: HOSPADM

## 2019-11-12 RX ORDER — DEXTROSE MONOHYDRATE 25 G/50ML
25-50 INJECTION, SOLUTION INTRAVENOUS
Status: DISCONTINUED | OUTPATIENT
Start: 2019-11-12 | End: 2019-11-16 | Stop reason: HOSPADM

## 2019-11-12 RX ADMIN — ACETAMINOPHEN 975 MG: 325 TABLET, FILM COATED ORAL at 01:55

## 2019-11-12 RX ADMIN — MIDAZOLAM 2 MG: 1 INJECTION INTRAMUSCULAR; INTRAVENOUS at 10:59

## 2019-11-12 RX ADMIN — SODIUM CHLORIDE 300 ML: 9 INJECTION, SOLUTION INTRAVENOUS at 17:46

## 2019-11-12 RX ADMIN — HEPARIN SODIUM 3000 UNITS: 1000 INJECTION, SOLUTION INTRAVENOUS; SUBCUTANEOUS at 11:20

## 2019-11-12 RX ADMIN — AMLODIPINE BESYLATE 5 MG: 5 TABLET ORAL at 08:15

## 2019-11-12 RX ADMIN — Medication: at 17:47

## 2019-11-12 RX ADMIN — OXYCODONE HYDROCHLORIDE 10 MG: 5 TABLET ORAL at 01:55

## 2019-11-12 RX ADMIN — ACETAMINOPHEN 975 MG: 325 TABLET, FILM COATED ORAL at 20:23

## 2019-11-12 RX ADMIN — FENTANYL CITRATE 50 MCG: 50 INJECTION, SOLUTION INTRAMUSCULAR; INTRAVENOUS at 11:00

## 2019-11-12 RX ADMIN — ACETAMINOPHEN 975 MG: 325 TABLET, FILM COATED ORAL at 14:23

## 2019-11-12 RX ADMIN — SODIUM CHLORIDE 250 ML: 9 INJECTION, SOLUTION INTRAVENOUS at 17:47

## 2019-11-12 RX ADMIN — POLYETHYLENE GLYCOL 3350 17 G: 17 POWDER, FOR SOLUTION ORAL at 08:15

## 2019-11-12 RX ADMIN — CEFAZOLIN SODIUM 2 G: 2 INJECTION, SOLUTION INTRAVENOUS at 11:00

## 2019-11-12 RX ADMIN — ONDANSETRON 4 MG: 2 INJECTION INTRAMUSCULAR; INTRAVENOUS at 09:59

## 2019-11-12 RX ADMIN — OXYCODONE HYDROCHLORIDE 10 MG: 5 TABLET ORAL at 08:15

## 2019-11-12 RX ADMIN — OXYCODONE HYDROCHLORIDE 10 MG: 5 TABLET ORAL at 14:23

## 2019-11-12 RX ADMIN — MIDAZOLAM 2 MG: 1 INJECTION INTRAMUSCULAR; INTRAVENOUS at 11:07

## 2019-11-12 RX ADMIN — OXYCODONE HYDROCHLORIDE 10 MG: 5 TABLET ORAL at 20:23

## 2019-11-12 RX ADMIN — FENTANYL CITRATE 50 MCG: 50 INJECTION, SOLUTION INTRAMUSCULAR; INTRAVENOUS at 11:07

## 2019-11-12 RX ADMIN — ACETAMINOPHEN 975 MG: 325 TABLET, FILM COATED ORAL at 08:15

## 2019-11-12 ASSESSMENT — ACTIVITIES OF DAILY LIVING (ADL)
ADLS_ACUITY_SCORE: 13
ADLS_ACUITY_SCORE: 11
ADLS_ACUITY_SCORE: 13

## 2019-11-12 ASSESSMENT — MIFFLIN-ST. JEOR: SCORE: 1494.24

## 2019-11-12 NOTE — PLAN OF CARE
"Assumed cares      BP (!) 157/92   Pulse 79   Temp 97.6  F (36.4  C) (Oral)   Resp 18   Ht 1.854 m (6' 1\")   Wt 48 kg (105 lb 14.4 oz)   SpO2 98%   BMI 13.97 kg/m       Pain: Patient has left hip pain and pain by his right shoulder after the catheter was placed. Patient getting Oxy and tylenol q6h's.  Neuro:  A&Ox4.   Respiratory: Lungs clear and equal.  Cardiac/Neurovascular: HR and pulse WDL. Patient has LE edema.  GI/: Patient has decreased urine output. BM 11/12.   Nutrition: Patient was NPO prior to IR. Was advanced to a general diet this afternoon.   Activity: Independent.   Skin: No concerns.   Lines: PIV (SL). Tunneled catheter R chest placed 11/12.   Events this shift: After patient got back from IR he wanted to rest and did not want to be bugged. Patient has had a high potassium all day. Doctors placed orders for insulin and dextrose. Patient refused. Dialysis was ordered, patient also refused. Myself and the doctor sat down with the patient and explained the reasoning. Patient still refused and decided to walk outside and said he might not be coming back. Everything was documented. Patient returned 45 minutes later and agreed to dialysis. Patient went to dialysis around 5:30pm. Potassium does not need to be rechecked at midnight since patient went to dialysis.      Plan:  Continue to monitor. Get social work involved to help with discharge planning and outpatient dialysis. Concerns with not having insurance coverage.   "

## 2019-11-12 NOTE — PLAN OF CARE
Patient back from Hemodialysis unit at 1800 after 3 hr HD run in stable condition. Alert and oriented x4, ambulatory and with minimal assistance with ADLs. With internal jugular catheter in place, dressing intact. Ate 75% of supper and taking adequate amount of fluids. At 2000, oxycodone 10 mg was given for pain. He was seen and examined by Nephrology at 2030. Internal jugular catheter removed by MD, pressure dressing applied. Tolerated procedure well.  P: For IR tunneled central line catheter placement tomorrow. NPO post midnoc. Observe internal jugular site for bleeding Q 2 hrs and PRN.

## 2019-11-12 NOTE — CONSULTS
"Social Work Services Progress Note    Hospital Day: 8  Collaborated with:  Chart review, RNCC, primary team Ting Cabello    Data:  Pt is a 29-year-old male admitted to [facility] 11/5/19.     Intervention:  SW consult received for \"discharge planning.\" Primary team aware of barriers to discharge. Pt currently requiring new HD with no insurance. Pt declined financial counseling assistance but per primary team is now agreeable with discussing insurance options. Per financial counseling notes, they attempted to meet with pt today but pt was sleeping. Awaiting financial counseling meeting prior to being able to arrange outpt HD. RNCC following for potential home discharge and arrangement of HD.    Assessment:  Acknowledge SW consult    Plan:    Anticipated Disposition:  Home with services    Barriers to d/c plan:  Pt has no insurance and is now on HD    Follow Up:   to follow and assist as needed    LEXIE Bradley, [facility]    [facility]  Pager [phone]  Phone [phone]        "

## 2019-11-12 NOTE — PROGRESS NOTES
"Doctor came and talked to the patient about high potassium levels. Patient was refusing to start dextrose and insulin. Patient also refused dialysis. The doctor and myself explained to the patient what could happen if he kept refusing cares. We clearly explained death is a possibility. Patient wanted some time alone. I checked on him 15 minutes later and he was walking out the door and stated, \"Im done with this, Im going to go smoke a cigarette and don't know if I am coming back\". Doctor notified. Continue to monitor and see if patient returns.   "

## 2019-11-12 NOTE — PROGRESS NOTES
[facility], [place]    Progress Note - Ting Cabello Service        Date of Admission:  11/5/2019    Assessment & Plan   28 y/o M w/ERROL/AIN d/t rhabdomyolysis following 'black-out' d/t polysubstance abuse w/new left hip pain which started Sunday morning.      Active issues as of 11/12/19 include 1) ongoing renal failure which is improving (non oliguric) now with tunneled line for HD as well as 2) Financial issues with respect to insurance precluding safe discharge plan.     Changes today   - Tunneled line placed by IR   - Financial counselor to touch base with pt as able. This is precluding safe discharge plan as he cannot currently receive HD as an outpatient without insurance.  - Pain management will need to be discussed closer to discharge (now using 10mg tid consistently). Will discuss tapering to Q8h 11/13    ACUTE RENAL FAILURE (RIFLE III) d/t RHABDOMYOLYSIS w/AIN   CK 44K w/anephric ERROL RIFLE stage III likely 2/2 ATN from rhabdomyolysis. Initially requiring RRT then iHD. Now kidney function showing signs of recovery and urine output appropriate. No insurance thus OP hemodialysis will be difficult to arrange. Notably urine output improving.   - Nephrology following              - RRT per nephrology then iHD              - Unclear at this point potential for recovery, will CTM  - Strict I/O     HTN  Likely related to renal failure.   - Amlodipine 5mg daily      MECHANICAL FALL   ACUTE LEFT HIP PAIN   No fx on imaging. Concern for compartment syndrome. Evaluated by vascular and orthopedic surgery. No evidence of compartment syndrome. Appreciate surgeries expletives.    - Oxycodone 5-10 mg Q6h PRN   - Scheduled Tylenol  - Lidocaine patch qAM     POLYSUBSTANCE ABUSE   Tox screen + Amphetamines, Cannabinoids. Patient wants time before he talks to chem dep. He reports that it was a one time incident where got too drunk.   Continues to decline as of 11/11      Diet: Room Service  Regular  Diet Adult    Fluids: None   Lines: Tunenled HD line   DVT Prophylaxis: Low Risk/Ambulatory with no VTE prophylaxis indicated  Jaramillo Catheter: not present  Code Status: Full Code      Disposition Plan   Expected discharge: 4 - 7 days, recommended to prior to admit living arrangement once renal function improved.  Entered: Suzette Abernathy MD 11/12/2019, 12:36 PM       The patient's care was discussed with the Attending Physician, Dr. Orellana and Patient.    Suzette Abernathy MD  [address], [place]  Pager: [phone]  Please see sticky note for cross cover information  ______________________________________________________________________    Interval History   Pt had one bought of emesis, feels it's due to a sandwich he and his brother both ate, perhaps didn't sit well in his stomach. Apart from this has no other issues .Would like to eat.     Data reviewed today: I reviewed all medications, new labs and imaging results over the last 24 hours. K - 5.6 this AM, Cr 8 down from 10.     Physical Exam   Vital Signs: Temp: 97.2  F (36.2  C) Temp src: Oral BP: 129/77 Pulse: 71 Heart Rate: 71 Resp: 16 SpO2: 96 % O2 Device: None (Room air) Oxygen Delivery: 2 LPM  Weight: 248 lbs 14.4 oz  General Appearance: Well appearing, resting in bed, pplite gentleman.   Respiratory: clear lung sounds BL   Cardiovascular: RRR, no mrg   GI: soft, nt and nd   Skin: deferred LE examination today as pt sleeping

## 2019-11-12 NOTE — PROGRESS NOTES
Patient Name: Sawyer Walters  Medical Record Number: [id number]  Today's Date: 11/12/2019    Procedure: tunnel line placement   Proceduralist: Grabiel Vieira PA-C    Sedation start time: 1105  Sedation end time: 1120  Sedation medications administered: 4mg versed 100mcg fentanyl     Procedure start time: 1105  Procedure end time: 1120    Report given to:  RN    Pt arrived to IR room 5 from . Consent obtained /reviewed. Pt denies any questions or concerns regarding procedure. Pt arrives on phone with mom face timing staff informed him that cell phones aren't allowed. Pt positioned supine and monitored per protocol. Pt tolerated procedure without any noted complications. Pt transferred back to

## 2019-11-12 NOTE — PRE-PROCEDURE
GENERAL PRE-PROCEDURE:   Procedure:  Place dialysis catheter  Date/Time:  11/12/2019 10:17 AM    Verbal consent obtained?: Yes    Written consent obtained?: Yes    Risks and benefits: Risks, benefits and alternatives were discussed    Consent given by:  Patient  Patient states understanding of procedure being performed: Yes    Patient's understanding of procedure matches consent: Yes    Procedure consent matches procedure scheduled: Yes    Expected level of sedation:  Moderate  Appropriately NPO:  Yes  ASA Class:  Class 2- mild systemic disease, no acute problems, no functional limitations  Mallampati  :  Grade 2- soft palate, base of uvula, tonsillar pillars, and portion of posterior pharyngeal wall visible  Lungs:  Lungs clear with good breath sounds bilaterally  Heart:  Normal heart sounds and rate  History & Physical reviewed:  History and physical reviewed and no updates needed  Statement of review:  I have reviewed the lab findings, diagnostic data, medications, and the plan for sedation

## 2019-11-12 NOTE — PROGRESS NOTES
"Brief note     Long discussion with patient ;he states he is overwhelmed and doesn't want any further cares today. Per RN patient is refusing lab draws and medications.   With repeat K at 5.6, as well as peaked T waves on initial EKG , I explained to him that I am concerned about sudden cardiac death in light of hyperkalemia that is progressive. I stated angel rowley that there are no symptoms per se of hyperkalemia as he repeatedly told me he \"feels well.\"   I stated that at this point he has two options 1) short HD run versus 2) repeat K checks at least 2 times overnight as well as medication administration perhaps multiple times overnight.   He wished for time to think and let us know.   I reiterated that there is a high concern that without any treatment of his potassium that he would have risk of significant arrhythmias and cardiac arrest in light of hyperkalemia. I explained to him that the likelihood of him surviving a few days without dialysis is near zero and that he would die without dialysis.   "

## 2019-11-12 NOTE — PROCEDURES
[facility], [place]    Procedure: IR Procedure Note  Date/Time: 11/12/2019 11:27 AM  Performed by: Enzo Vieira PA-C  Authorized by: Enzo Vieira PA-C     UNIVERSAL PROTOCOL   Site Marked: NA  Prior Images Obtained and Reviewed:  Yes  Required items: Required blood products, implants, devices and special equipment available    Patient identity confirmed:  Verbally with patient and arm band  Patient was reevaluated immediately before administering moderate or deep sedation or anesthesia  Confirmation Checklist:  Patient's identity using two indicators, relevant allergies, procedure was appropriate and matched the consent or emergent situation and correct equipment/implants were available  Time out: Immediately prior to the procedure a time out was called    Preparation: Patient was prepped and draped in usual sterile fashion    ESBL (mL):  1     ANESTHESIA    Anesthesia: Local infiltration  Local Anesthetic:  Lidocaine 1% without epinephrine  Anesthetic Total (mL):  9      SEDATION    Patient Sedated: Yes    Sedation Type:  Moderate (conscious) sedation  Sedation:  Fentanyl and midazolam  Vital signs: Vital signs monitored during sedation    See dictated procedure note for full details.  Findings: Sedation start time: 1105  Sedation end time: 1120  Sedation medications administered: 4mg versed 100mcg fentanyl      Procedure start time: 1105  Procedure end time: 1120    Specimens: none    Complications: None    Condition: Stable    PROCEDURE   Patient Tolerance:  Patient tolerated the procedure well with no immediate complications  Describe Procedure: Sawyer WaltersAdama  MRN: [id number]    Completed placement of 14.5 Cameroonian 23 cm dual lumen, Palindrome brand, tunneled central venous access catheter via RIJV.  Tip lying in the right atrium.  Catheter okay to use immediately.  Dx:  Hemodialysis status.  Isha.      Sedation start time: 1105  Sedation end time:  1120  Sedation medications administered: 4mg versed 100mcg fentanyl      Procedure start time: 1105  Procedure end time: 1120  Time of Sedation in Minutes by Physician:  15

## 2019-11-12 NOTE — PROVIDER NOTIFICATION
"Provider pager #[phone] notified w/ this message:   \" Pt has high K+ this AM of 5.6. Please advise.\"  Will continue to monitor & follow POC.     MD Upton returned page, will order bowel prep and EKG. Will continue to monitor & follow POC.   "

## 2019-11-12 NOTE — PLAN OF CARE
"Status: Many visitors present in room last night. High BP this AM (158/91). Pt pleasant and cooperative w/ cares, plays video games in room. C/o left hip and leg pain, oxycodone & tylenol administered x1. Pt alert and oriented x4. Breathing adequately on RA, LS clear/equal bilaterally. IJ catheter removed yesterday, dressing checks q2h. Pressure dressing in place and CDI. Around 0200 pt reported dressing was \"falling off\", new dressing applied and is CDI. Compliant w/ voiding in urinal in bathroom to  monitor I/Os, about 350cc pale urine voided this shift. Diet order NPO @ midnight for IR tunneled catheter placement today. Pt aware he is supposed to be NPO, however reports drinking sips of apple juice and water and states \"I can't help it\". Last sip of juice around midnight or 1AM.  Writer reinforced education about importance of NPO status prior to proc, pt verbalized understanding. Small amts of ice chips offered for comfort. Up independently in room, frequently wheels self in wheelchair.     Plan of care: To have IR tunneled catheter placement today, around 1:30 PM per IR. Will need scrub prep on neck and chest prior to IR catheter placement. Potassium high this AM, stool softeners ordered and will need to be administered.  Will need outpatient dialysis. Continue to monitor and follow POC.     Pt frequently leaves unit, last  left this AM around 0600. Will update pt about IR time when he returns.   "

## 2019-11-12 NOTE — PROGRESS NOTES
Patient has been educated on potential risks of choosing to leave the unit and that the responsibility for patient well-being will belong to the patient. Pt has been informed that admission to hospital is due to need for medical treatment. Education given to the patient on some of the potential risks included but are not limited to:      - lack of access to nursing intervention      - possible missed appointments with MD, therapies, tests      - possible missed medications, antibiotics, management of IV's    Patient Response:yes    Patient notified staff prior to leaving unit: yes  Coban wrap placed over IV prior to pt leaving unit yes

## 2019-11-13 LAB
CREAT SERPL-MCNC: 6.93 MG/DL (ref 0.66–1.25)
ERYTHROCYTE [DISTWIDTH] IN BLOOD BY AUTOMATED COUNT: 13 % (ref 10–15)
GFR SERPL CREATININE-BSD FRML MDRD: 10 ML/MIN/{1.73_M2}
HCT VFR BLD AUTO: 33.6 % (ref 40–53)
HGB BLD-MCNC: 11.5 G/DL (ref 13.3–17.7)
INTERPRETATION ECG - MUSE: NORMAL
INTERPRETATION ECG - MUSE: NORMAL
MCH RBC QN AUTO: 30.1 PG (ref 26.5–33)
MCHC RBC AUTO-ENTMCNC: 34.2 G/DL (ref 31.5–36.5)
MCV RBC AUTO: 88 FL (ref 78–100)
PLATELET # BLD AUTO: 213 10E9/L (ref 150–450)
POTASSIUM SERPL-SCNC: 5.4 MMOL/L (ref 3.4–5.3)
POTASSIUM SERPL-SCNC: 5.6 MMOL/L (ref 3.4–5.3)
RBC # BLD AUTO: 3.82 10E12/L (ref 4.4–5.9)
WBC # BLD AUTO: 9.7 10E9/L (ref 4–11)

## 2019-11-13 PROCEDURE — 84132 ASSAY OF SERUM POTASSIUM: CPT | Performed by: STUDENT IN AN ORGANIZED HEALTH CARE EDUCATION/TRAINING PROGRAM

## 2019-11-13 PROCEDURE — 85027 COMPLETE CBC AUTOMATED: CPT | Performed by: STUDENT IN AN ORGANIZED HEALTH CARE EDUCATION/TRAINING PROGRAM

## 2019-11-13 PROCEDURE — 25800030 ZZH RX IP 258 OP 636: Performed by: INTERNAL MEDICINE

## 2019-11-13 PROCEDURE — 93010 ELECTROCARDIOGRAM REPORT: CPT | Performed by: INTERNAL MEDICINE

## 2019-11-13 PROCEDURE — 25000132 ZZH RX MED GY IP 250 OP 250 PS 637: Performed by: STUDENT IN AN ORGANIZED HEALTH CARE EDUCATION/TRAINING PROGRAM

## 2019-11-13 PROCEDURE — 93005 ELECTROCARDIOGRAM TRACING: CPT

## 2019-11-13 PROCEDURE — 90937 HEMODIALYSIS REPEATED EVAL: CPT

## 2019-11-13 PROCEDURE — 84132 ASSAY OF SERUM POTASSIUM: CPT | Performed by: PEDIATRICS

## 2019-11-13 PROCEDURE — 85048 AUTOMATED LEUKOCYTE COUNT: CPT | Performed by: STUDENT IN AN ORGANIZED HEALTH CARE EDUCATION/TRAINING PROGRAM

## 2019-11-13 PROCEDURE — 12000001 ZZH R&B MED SURG/OB UMMC

## 2019-11-13 PROCEDURE — 99233 SBSQ HOSP IP/OBS HIGH 50: CPT | Performed by: INTERNAL MEDICINE

## 2019-11-13 PROCEDURE — 82565 ASSAY OF CREATININE: CPT | Performed by: STUDENT IN AN ORGANIZED HEALTH CARE EDUCATION/TRAINING PROGRAM

## 2019-11-13 PROCEDURE — 36415 COLL VENOUS BLD VENIPUNCTURE: CPT | Performed by: STUDENT IN AN ORGANIZED HEALTH CARE EDUCATION/TRAINING PROGRAM

## 2019-11-13 RX ORDER — NICOTINE POLACRILEX 4 MG
15-30 LOZENGE BUCCAL
Status: DISCONTINUED | OUTPATIENT
Start: 2019-11-13 | End: 2019-11-13

## 2019-11-13 RX ORDER — DEXTROSE MONOHYDRATE 100 MG/ML
INJECTION, SOLUTION INTRAVENOUS CONTINUOUS
Status: ACTIVE | OUTPATIENT
Start: 2019-11-13 | End: 2019-11-13

## 2019-11-13 RX ORDER — DEXTROSE MONOHYDRATE 25 G/50ML
25 INJECTION, SOLUTION INTRAVENOUS ONCE
Status: DISCONTINUED | OUTPATIENT
Start: 2019-11-13 | End: 2019-11-13

## 2019-11-13 RX ORDER — AMLODIPINE BESYLATE 10 MG/1
10 TABLET ORAL DAILY
Status: DISCONTINUED | OUTPATIENT
Start: 2019-11-14 | End: 2019-11-16 | Stop reason: HOSPADM

## 2019-11-13 RX ORDER — DEXTROSE MONOHYDRATE 25 G/50ML
25-50 INJECTION, SOLUTION INTRAVENOUS
Status: DISCONTINUED | OUTPATIENT
Start: 2019-11-13 | End: 2019-11-13

## 2019-11-13 RX ADMIN — OXYCODONE HYDROCHLORIDE 10 MG: 5 TABLET ORAL at 09:30

## 2019-11-13 RX ADMIN — ACETAMINOPHEN 975 MG: 325 TABLET, FILM COATED ORAL at 03:13

## 2019-11-13 RX ADMIN — ACETAMINOPHEN 975 MG: 325 TABLET, FILM COATED ORAL at 21:54

## 2019-11-13 RX ADMIN — SODIUM CHLORIDE 250 ML: 9 INJECTION, SOLUTION INTRAVENOUS at 16:35

## 2019-11-13 RX ADMIN — Medication: at 16:35

## 2019-11-13 RX ADMIN — OXYCODONE HYDROCHLORIDE 10 MG: 5 TABLET ORAL at 15:56

## 2019-11-13 RX ADMIN — ACETAMINOPHEN 975 MG: 325 TABLET, FILM COATED ORAL at 09:30

## 2019-11-13 RX ADMIN — OXYCODONE HYDROCHLORIDE 10 MG: 5 TABLET ORAL at 03:13

## 2019-11-13 RX ADMIN — AMLODIPINE BESYLATE 5 MG: 5 TABLET ORAL at 10:31

## 2019-11-13 RX ADMIN — ACETAMINOPHEN 975 MG: 325 TABLET, FILM COATED ORAL at 15:56

## 2019-11-13 RX ADMIN — OXYCODONE HYDROCHLORIDE 10 MG: 5 TABLET ORAL at 21:53

## 2019-11-13 RX ADMIN — SODIUM CHLORIDE 300 ML: 9 INJECTION, SOLUTION INTRAVENOUS at 16:35

## 2019-11-13 ASSESSMENT — ACTIVITIES OF DAILY LIVING (ADL)
ADLS_ACUITY_SCORE: 13

## 2019-11-13 ASSESSMENT — MIFFLIN-ST. JEOR: SCORE: 2117.03

## 2019-11-13 NOTE — PROGRESS NOTES
HEMODIALYSIS TREATMENT NOTE    Date: 11/12/2019  Time: 1945    Data:  Pre Wt: 111 kg  Desired Wt: 108 kg  Post Wt: 107.9 kg  Weight change: 3.1 kg  Ultrafiltration - Post Run Net Total Removed (mL): 3000 mL  Vascular Access Status: patent  Dialyzer Rinse: Light  Total Blood Volume Processed: 50.19 L  Total Dialysis (Treatment) Time: 2 hours    Lab:  No    Assessment/Interventions: 2 hour HD run via right internal jugular dialysis catheter.  Blood flow 450 mL/min.   3 L removed.  Pt slightly hypertensive through out run.  Report called.     Plan:  Continue with plan of care.  Next run per Renal Team.

## 2019-11-13 NOTE — PROGRESS NOTES
"CLINICAL NUTRITION SERVICES - ASSESSMENT NOTE     Nutrition Prescription    RECOMMENDATIONS FOR MDs/PROVIDERS TO ORDER:  None at this time    Malnutrition Status:    Patient does not meet two of the above criteria necessary for diagnosing malnutrition    Recommendations already ordered by Registered Dietitian (RD):  Order Nepro Vanilla supplement at 2pm.     Future/Additional Recommendations:  Monitor acceptance of nutrition supplement.  Monitor PO intake.  Continue to provide diet education on potassium in foods     REASON FOR ASSESSMENT  Sawyer Walters is a/an 29 year old male assessed by the dietitian for [facility] ERROL/AIN d/t rhabdomyolysis following 'black-out' d/t polysubstance abuse w/new left hip pain which started Sunday morning. Need for HD.    NUTRITION HISTORY  Pt reports of eating 6 meals/day PTA along with a weight being between 215-230 lbs. Appetite PTA was good.    CURRENT NUTRITION ORDERS  Diet: Regular  Intake/Tolerance: Pt reports a good appetite and that he has been eating well. Pt has been taking 75% of meals, however food records have been inconsistent. Ordering on average one meal/day from meal ordering system. Pt reports of ordering foods from [employer] instead of ordering through the hospital meal system.     LABS  K is 5.6 (H)  Cr is 6.93 (H)  Phos is 5.2 (H)     MEDICATIONS  Glucagon injection, Miralax, Zofran, and Sennosides    ANTHROPOMETRICS  Height: 185.4 cm (6' 1\")  Most Recent Weight: 48 kg (105 lb 14.4 oz)-- this weight is likely inaccurate due to weight history.  Most recent weight: 112.9 kg (248 lb 14.4 oz)    IBW: 83.6 kg  BMI: Obesity Grade I BMI 30-34.9  BMI does not accurately reflect pt's physical appearance, pt appears to have high muscle mass.   Weight History: Pt reported UBW of 215-230 lbs. He hasn't noticed any muscle or weight loss.   Wt Readings from Last 15 Encounters:   11/12/19 48 kg (105 lb 14.4 oz)       Dosing Weight: 91 kg  Used ABW because of percentage " "of IBW was > than 120%    ASSESSED NUTRITION NEEDS  Estimated Energy Needs: 1,820- 2,275 kcals/day (20 - 25 kcals/kg)  Justification: Maintenance and Obese  Estimated Protein Needs: 109.2-136.5 grams protein/day (1.2 - 1.5 grams of pro/kg)  Justification: Dialysis and Obesity guidelines  Estimated Fluid Needs:  2,275- 2,730 mL/day (25 - 30 mL/kg)   Justification: Per provider pending fluid status    PHYSICAL FINDINGS  See malnutrition section below.  No abnormal nutrition-related physical findings observed.     MALNUTRITION  % Intake: No decreased intake noted  % Weight Loss: None noted  Subcutaneous Fat Loss: None observed (visual inspection only, pt declined physical assessment)  Muscle Loss: None observed (visual inspection only, pt declined physical assessment)  Fluid Accumulation/Edema: None noted  Malnutrition Diagnosis: Patient does not meet two of the above criteria necessary for diagnosing malnutrition    NUTRITION DIAGNOSIS  Predicted inadequate nutrient intake related to pt is currently eating well as evidenced by potential decline of intake due to LOS and acute illness.      INTERVENTIONS  Implementation  Nutrition Education: Provided education on discussed PO intake and role of RD.  Medical food supplement therapy - see above  Pt not available for further education regarding low potassium diet. Left handout \"potassium content of foods\" in the pt's room with the recommendation of 2g potassium written on the handout.     Goals  Patient to consume % of nutritionally adequate meal trays TID, or the equivalent with supplements/snacks.     Monitoring/Evaluation  Progress toward goals will be monitored and evaluated per protocol.    Laurie Rosen  Dietetic Intern    I have read & agree with the above nutrition assessment and interventions.   Cecilia Travis RD, LD  5A/5B RD pager [phone]          "

## 2019-11-13 NOTE — PLAN OF CARE
Writer cared for pt [id number]. Patient alert and oriented. Up ad chiara. PIV SL. Up walking around unit and outside for a few minutes at a time. Girlfriend spent the night by bedside. Patient notified charged RN when leaving unit. Daily safety note completed. C/o of headache and requested prn oxycodone and scheduled tylenol administered. Redraw for K+ resulted at 5.4. Writer consulted with Dr. Upton Jewell notified about K+ result, Dr. Upton stated that it was okay to hold off on correcting this result, however to page back if AM labs indicates an increase. This AM K+ resulted at 5.6 and Attending Dr. Bradford notified. Plan to dialyze pt at 0800.  R: Nurses advised by team to encourage pt to adhere with dialysis regimen. Continue to monitor and implement poc.

## 2019-11-13 NOTE — PROGRESS NOTES
Nephrology Progress Note  11/12/2019         Assessment & Recommendations:     Sawyer Walters is a 29 year old with history of polysubstance abuse admitted with rising creatinine and elevated CK level consistent with rhabdomyolysis.Patient reported blacking out and not remembering any recent events over the last 12 hours prior to seeking medical attention. Labs consistent with ERROL , Rhabdomyolysis      Acute renal failure secondary to rhabdomyolysis leading to ATN     Etiology : Most likely secondary to ATN in the setting of rhabdomyolysis  Urine did show WBC clumps but no granular casts.  FENA 2.8%  Creatinine decreased due to HD  Got tunneled HD cath placed  UOP decreased  Remains hypertensive   Hyperkalemic   O/e has 1+ edema though chest is clear to auscultation   I doubt oral intake is being documented accurately   Pre dialysis weight 111 kg  Post dialysis weight 107.9 kg  His weight on admission was 97.5 kg  Clinically appears to be hypervolemic   -- we ran HD today for 2 hrs with with 3 L UF . Patient still remains hypertensive     Further dialysis needs to be decided based on clinical response and renal function labs .  Renal recovery is still expected however can take up to 3-4 weeks in patients with ERROL assuming he started with normal kidney function,     Advise strict I/O      Blood pressure: uncontrolled . .Continue amlodipine 5 mg daily,Labetalol prn    Volume status :  Appears hypervolemic     Acidosis - resolved with HD    Electrolytes : mild hyponatremia , likely secondary to ERROL. Continue to monitor    Mild leukocytosis: Likely secondary to rhabdomyolysis. Resolved      Right hip pain, likely secondary to compartment syndrome with resultant rhabdomyolysis.  Being followed and managed by primary team and orthopedic surgery team. CK 4.7K    Abnormal LFT , improving      Plan of care discussed with patient.  Recommendations were communicated to primary team via note and verbally  Patient seen and  "discussed with Dr Zeferino Mendieta MD  Nephrology Fellow   Pager [phone]  [facility]        Interval History :   Nursing and provider notes from last 24 hours reviewed.  In the last 24 hours Sawyer Walters has had improved UOP    Review of Systems:     Constitutional : No fever. Chills  Neuro: No confusion, headache, focal weakness  Chest: No SOB . No cough  Cardiovascular : No Chest pain   Extremities: Positive for leg swelling  Gastrointestinal : No abdominal pain, nausea,vomiting. No diarrhea, constipation. No black stool .   : No flank pain , hematuria or voiding difficulty   Musculoskeletal: left hip pain       Physical Exam:   I/O last 3 completed shifts:  In: 0   Out: 3300 [Urine:300; Other:3000]   BP (!) 143/93   Pulse 72   Temp 97.8  F (36.6  C) (Oral)   Resp 15   Ht 1.854 m (6' 1\")   Wt 48 kg (105 lb 14.4 oz)   SpO2 96%   BMI 13.97 kg/m       GENERAL APPEARANCE: no distress,  awake  Pulmonary: lungs clear to auscultation with equal breath sounds bilaterally, no clubbing  CV: regular rhythm, normal rate, no rub   - JVD    - Edema  1+ bilateral pedal edema   GI: soft, nontender, normal bowel sounds  MS: no evidence of inflammation in joints, no muscle tenderness  NEURO: face symmetric,no asterixis   Musculoskeletal : Tenderness in the left hip region, along the greater trochanter area  Extremities : distal CMS intact  Labs:   All labs reviewed by me  Electrolytes/Renal -   Recent Labs   Lab Test 11/12/19  1416 11/12/19  0446 11/11/19  0604 11/10/19  0545 11/09/19  0507 11/08/19  0637 11/07/19  0512   NA  --  133 135 133 133 132* 130*   POTASSIUM 5.6* 5.6* 5.2 5.0 4.6 5.0 4.4   CHLORIDE  --  98 98 99 101 98 97   CO2  --  24 27 24 24 27 24   BUN  --  59* 79* 66* 49* 49* 64*   CR  --  8.40* 10.90* 9.84* 8.16* 8.80* 9.33*   GLC  --  93 102* 100* 98 94 106*   JOSTIN  --  8.8 8.1* 8.2* 8.2* 8.6 8.2*   PHOS  --   --   --   --  5.2* 4.8* 5.7*       CBC -   Recent Labs   Lab Test " 11/12/19  0600 11/11/19  0604 11/10/19  0545   WBC 12.4* 10.6 11.7*   HGB 12.4* 12.1* 12.3*    183 167       LFTs -   Recent Labs   Lab Test 11/09/19  0507 11/08/19  0637 11/07/19  0512   ALKPHOS 66 73 79   BILITOTAL 0.3 0.5 0.5   * 306* 422*   * 302* 616*   PROTTOTAL 5.3* 5.8* 6.4*   ALBUMIN 2.2* 2.4* 2.6*       Iron Panel - No lab results found.      Imaging:  Pertinent imaging reviewed    Current Medications:    acetaminophen  975 mg Oral Q6H     alteplase  2 mg Intravenous Once     alteplase  2 mg Intravenous Once     amLODIPine  5 mg Oral Daily     dextrose  25 g Intravenous Once     insulin (Human Regular) (HumuLIN R/NovoLIN R) for intravenous use (SHORT ACTING)  10 Units Intravenous Once     lidocaine  1 patch Transdermal Q24H     lidocaine   Transdermal Q8H     lidocaine   Transdermal Q24h     polyethylene glycol  17 g Oral Daily     sodium chloride (PF)  3 mL Intracatheter Q8H       Anam Chaparro MD

## 2019-11-13 NOTE — PROGRESS NOTES
"Patient has been educated on potential risks of choosing to leave the unit and that the responsibility for patient well-being will belong to the patient. Pt has been informed that admission to hospital is due to need for medical treatment. Education given to the patient on some of the potential risks included but are not limited to:      - lack of access to nursing intervention      - possible missed appointments with MD, therapies, tests      - possible missed medications, antibiotics, management of IV's    Patient Response:\"I'm going outside anyway.\"    Patient notified staff prior to leaving unit: patient stated he was leaving soon but didn't notify us when he was actually leaving.   Coban wrap placed over IV prior to pt leaving unit no  "

## 2019-11-13 NOTE — PROGRESS NOTES
[facility], [place]    Progress Note - [facility] Service        Date of Admission:  11/5/2019    Changes today:   - Ortho evaluation  - Pending formal renal recs    Assessment & Plan   28 y/o M w/ERROL/AIN d/t rhabdomyolysis following 'black-out' d/t polysubstance abuse w/new left hip pain which started Sunday morning.     MECHANICAL FALL   ACUTE LEFT HIP PAIN   No fx on imaging. Initial concern for compartment syndrome. Evaluated by vascular and orthopedic surgery. No evidence of compartment syndrome. Appreciate surgeries expletives.  Initially with left hip/leg pain attributed to rhabdo. Now with worsening pain and swelling over left hip/buttock. Concern for fluid collection/hematoma based on exam.   - CT left hip  - Oxycodone 5-10 mg Q6h PRN   - Scheduled Tylenol  - Lidocaine patch qAM    PERSISTENT HYPERKALEMIA   Likley 2/2 dietary non complaince nad bringing in outside food. K 4.9 this morning  - Nutrition requested to  pt. We have also done so.   - HD as below     ACUTE RENAL FAILURE (RIFLE III) d/t RHABDOMYOLYSIS w/AIN   CK 44K w/anephric ERROL RIFLE stage III likely 2/2 ATN from rhabdomyolysis. Initially requiring RRT then iHD. Now kidney function showing signs of recovery and urine output appropriate. No insurance thus OP hemodialysis will be difficult to arrange. Notably urine output improving.   - Nephrology following  - Continue HD. Currently MWF schedule but has needed additional runs to hyperkalemia  - Unclear at this point potential for recovery,though hopeful with increased UOP  - Strict I/O     HTN  Likely related to renal failure, still uncontrolled   - Amlodipine increased to 10 mg daily      POLYSUBSTANCE ABUSE   Tox screen + Amphetamines, Cannabinoids. Patient wants time before he talks to chem dep. He reports that it was a one time incident where got too drunk. He feels like this was a wakeup call.     Dispo:   11/14 when roommate was added to room and pt was told  that significant other was no longer able to stay in the room or use the restroom, he became very upset and planned to leave [place]. He agreed to stay while we try to find a private room. I explained to him that he could die if he leaves the hospital with outpt HD established. If he does leave [place], I would be hesitant to have his tunneled line removed prior to leaving as he may need it emergently for dialysis when/if he represents.        Update: Ct results shows edema in different muscles around the hip. Per previous ortho evaluation, concerning of evolving compartment syndrome, but given his increase in pain, he will be re-evaluated by ortho. CK also re-ordered.        Diet: Room Service  Regular Diet Adult  Snacks/Supplements Adult: Nepro Oral Supplement; Between Meals    Fluids: None PO intake   Lines: Tunneled line   DVT Prophylaxis: Ambulate every shift  Jaramillo Catheter: not present  Code Status: Full Code      Disposition Plan   Expected discharge: 4 - 7 days, recommended to prior living arrangement once insurance and HD plans sorted out versus renal recovery to the point of not needing dialysis.  Entered: Ambrose Calabrese MD 11/13/2019, 4:47 PM     Ryne Orellana MD   of Medicine  Med-Peds Hospitalist  Pager [phone]  __________________________________________________________________    Interval History   Care team notes reviewed. Complains of worsening pain in left hip. Also worsened swelling. No fevers. Good PO intake. Increasing UOP. Pt left unit for several hours yesterday but did return. This am is very upset that he has a roommate so his SO can no longer stay in room or use bathroom.     4 pt ROS neg other than noted above    Data reviewed today: I reviewed all medications, new labs and imaging results over the last 24 hours.     Physical Exam   Vital Signs: Temp: 98.3  F (36.8  C) Temp src: Oral BP: (!) 144/91 Pulse: 88 Heart Rate: 78 Resp: 18 SpO2: 97 % O2 Device: None (Room air)     Weight: 243 lbs 3.2 oz  General Appearance: Well appearing male resting in bed, no distress, tattos present   Respiratory: CTAB  Cardiovascular: RRR, no MRG  GI: soft, NT, +BS  MSK: Left hip with skin tightness, swelling and tenderness as well as some fluctuance.

## 2019-11-13 NOTE — PROGRESS NOTES
"Per RN patient has been out of the hospital since 1030.  I attempted to call the number listed in the chart [phone] at 1321.  The voice mail was not set up so I could not leave a message.  He called back and told his RN that he was only gone for about an hour and a half and said he would be back \"shortly.\"  "

## 2019-11-13 NOTE — PLAN OF CARE
AVSS. Returned from dialysis approx 8pm. Walked up independently. Off unit x1. Showered. Prn oxy and scheduled tylenol admin for pain control. Able to make needs known. Continue POC.

## 2019-11-13 NOTE — DOWNTIME EVENT NOTE
The EMR was down for 2 hours on 11/13/2019.    Susana Garces was responsible for completing the paper charting during this time period.     The following information was re-entered into the system by Susana Garces RN: MAR    The following information will remain in the paper chart: Labs    Susana Garces RN  11/13/2019

## 2019-11-13 NOTE — PLAN OF CARE
"Patient has been educated on potential risks of choosing to leave the unit and that the responsibility for patient well-being will belong to the patient. Pt has been informed that admission to hospital is due to need for medical treatment. Education given to the patient on some of the potential risks included but are not limited to:      - lack of access to nursing intervention      - possible missed appointments with MD, therapies, tests      - possible missed medications, antibiotics, management of IV's    Patient Response: \" I understand.\"    Patient notified staff prior to leaving unit: Yes. Pt notified Charge PEDRO LUIS Khan wrap placed over IV prior to pt leaving unit. Yes.    "

## 2019-11-13 NOTE — PLAN OF CARE
Patient is alert and oriented x4, The patient is vitally stable on room air despite high blood pressures. The patient left the unit around 10:30 am and he still has not returned. The MD told him before he left that he needed to inform us before he left and he needed to leave his number on the board so we could get a hold of him, they also told him that if he was gone longer than 4 hours he would be discharged out of the system. The patient did not specifically tell me when he was leaving he just told me he was leaving soon,(in a joking manner he said he was going to go to the mall, I said that while you are admitted to the hospital you are not supposed to leave the hospital grounds, I then went over the daily safety note criteria again) the last time I or anyone else saw him was 10:30 am. He also did not leave his number on the board. We called the number listed under his contact info, he did not answer. He then called us back, he said he will return soon, I informed him that he is scheduled for dialysis so he needs to come back. We are to call dialysis when he returns. Continue to monitor and notify MD of any changes.       UPDATE: 2 PM he still has not returned.

## 2019-11-13 NOTE — PROGRESS NOTES
"    [facility], [place]    Progress Note - Ting Cabello Service        Date of Admission:  11/5/2019    Changes today:   - Persistent hyperkalemia w/o overt evidence of peaked T waves   - Nutrition counseling   - HD today  - Continue to attempt to work out financial counseling.   - Discussed that it os OK for patient to leave for fresh air outside the hospital however he cannot leave for prolonged periods of time. We expressed to him that we are unable to give him a \"pass\" or additional permission to leave the floor as he requires inpatient stay for ongoing dialysis needs. We expressed that he must leave cell phone # on board and make RN aware of his departure from floor. Furthermore, when he opts to leave he risks missing critical treatments such as dialysis which are life saving.     Assessment & Plan   28 y/o M w/ERROL/AIN d/t rhabdomyolysis following 'black-out' d/t polysubstance abuse w/new left hip pain which started Sunday morning.     PERSISTENT HYPERKALEMIA   Likley 2/2 dietary non complaince nad bringing in outside food.   - Nutrition requested to  pt. We have also done so.   - HD as below     ACUTE RENAL FAILURE (RIFLE III) d/t RHABDOMYOLYSIS w/AIN   CK 44K w/anephric ERROL RIFLE stage III likely 2/2 ATN from rhabdomyolysis. Initially requiring RRT then iHD. Now kidney function showing signs of recovery and urine output appropriate. No insurance thus OP hemodialysis will be difficult to arrange. Notably urine output improving.   - Nephrology following              - RRT per nephrology then iHD              - Unclear at this point potential for recovery, will CTM  - Strict I/O     HTN  Likely related to renal failure.   - Amlodipine 5mg daily      MECHANICAL FALL   ACUTE LEFT HIP PAIN   No fx on imaging. Concern for compartment syndrome. Evaluated by vascular and orthopedic surgery. No evidence of compartment syndrome. Appreciate surgeries expletives.    - Oxycodone 5-10 mg Q6h " PRN   - Scheduled Tylenol  - Lidocaine patch qAM     POLYSUBSTANCE ABUSE   Tox screen + Amphetamines, Cannabinoids. Patient wants time before he talks to chem dep. He reports that it was a one time incident where got too drunk.       Diet: Room Service  Regular Diet Adult  Snacks/Supplements Adult: Nepro Oral Supplement; Between Meals    Fluids: None PO intake   Lines: Tunneled line   DVT Prophylaxis: Ambulate every shift  Jaramillo Catheter: not present  Code Status: Full Code      Disposition Plan   Expected discharge: 4 - 7 days, recommended to prior living arrangement once insurance and HD plans sorted out versus renal recovery to the point of not needing dialysis.  Entered: Suzette Abernathy MD 11/13/2019, 11:57 AM     The patient's care was discussed with the Attending Physician, Dr. Orellana, Patient and Nephrology Consultant.    Suzette Abernathy MD  [place] [id number] Service  [facility], [place]  Pager: [phone]  Please see sticky note for cross cover information  ______________________________________________________________________    Interval History   ESTRELLITA  Had HD yday however K remained elevated overnight and into the morning. AM EKG w/o overt EKG changes.     Data reviewed today: I reviewed all medications, new labs and imaging results over the last 24 hours.     Physical Exam   Vital Signs: Temp: 97.6  F (36.4  C) Temp src: Oral BP: (!) 156/102 Pulse: 72 Heart Rate: 78 Resp: 16 SpO2: 100 % O2 Device: None (Room air)    Weight: 105 lbs 14.4 oz  General Appearance: Well appearing male resting in bed, no distress, tattos present   Respiratory: deferred   Cardiovascular: deferred   GI: deferred   Skin: Warm, wearing street clothes and no rashes on exposed skin.   Other: Oriented to name, place and time. Wishing to get some fresh air.

## 2019-11-14 ENCOUNTER — APPOINTMENT (OUTPATIENT)
Dept: CT IMAGING | Facility: CLINIC | Age: 29
DRG: 557 | End: 2019-11-14
Attending: INTERNAL MEDICINE

## 2019-11-14 LAB
ANION GAP SERPL CALCULATED.3IONS-SCNC: 7 MMOL/L (ref 3–14)
BUN SERPL-MCNC: 33 MG/DL (ref 7–30)
CALCIUM SERPL-MCNC: 8.7 MG/DL (ref 8.5–10.1)
CHLORIDE SERPL-SCNC: 102 MMOL/L (ref 94–109)
CK SERPL-CCNC: 478 U/L (ref 30–300)
CO2 SERPL-SCNC: 27 MMOL/L (ref 20–32)
CREAT SERPL-MCNC: 4.86 MG/DL (ref 0.66–1.25)
GFR SERPL CREATININE-BSD FRML MDRD: 15 ML/MIN/{1.73_M2}
GLUCOSE SERPL-MCNC: 98 MG/DL (ref 70–99)
MAGNESIUM SERPL-MCNC: 1.9 MG/DL (ref 1.6–2.3)
PHOSPHATE SERPL-MCNC: 6.1 MG/DL (ref 2.5–4.5)
POTASSIUM SERPL-SCNC: 4.9 MMOL/L (ref 3.4–5.3)
SODIUM SERPL-SCNC: 137 MMOL/L (ref 133–144)

## 2019-11-14 PROCEDURE — 83735 ASSAY OF MAGNESIUM: CPT | Performed by: STUDENT IN AN ORGANIZED HEALTH CARE EDUCATION/TRAINING PROGRAM

## 2019-11-14 PROCEDURE — 99233 SBSQ HOSP IP/OBS HIGH 50: CPT | Mod: GC | Performed by: INTERNAL MEDICINE

## 2019-11-14 PROCEDURE — 82550 ASSAY OF CK (CPK): CPT | Performed by: STUDENT IN AN ORGANIZED HEALTH CARE EDUCATION/TRAINING PROGRAM

## 2019-11-14 PROCEDURE — 25000132 ZZH RX MED GY IP 250 OP 250 PS 637: Performed by: STUDENT IN AN ORGANIZED HEALTH CARE EDUCATION/TRAINING PROGRAM

## 2019-11-14 PROCEDURE — 36415 COLL VENOUS BLD VENIPUNCTURE: CPT | Performed by: STUDENT IN AN ORGANIZED HEALTH CARE EDUCATION/TRAINING PROGRAM

## 2019-11-14 PROCEDURE — 84132 ASSAY OF SERUM POTASSIUM: CPT | Performed by: STUDENT IN AN ORGANIZED HEALTH CARE EDUCATION/TRAINING PROGRAM

## 2019-11-14 PROCEDURE — 80048 BASIC METABOLIC PNL TOTAL CA: CPT | Performed by: STUDENT IN AN ORGANIZED HEALTH CARE EDUCATION/TRAINING PROGRAM

## 2019-11-14 PROCEDURE — 12000001 ZZH R&B MED SURG/OB UMMC

## 2019-11-14 PROCEDURE — 73700 CT LOWER EXTREMITY W/O DYE: CPT | Mod: LT

## 2019-11-14 PROCEDURE — 84100 ASSAY OF PHOSPHORUS: CPT | Performed by: STUDENT IN AN ORGANIZED HEALTH CARE EDUCATION/TRAINING PROGRAM

## 2019-11-14 RX ORDER — SODIUM CHLORIDE 9 MG/ML
INJECTION, SOLUTION INTRAVENOUS CONTINUOUS
Status: DISCONTINUED | OUTPATIENT
Start: 2019-11-14 | End: 2019-11-16 | Stop reason: HOSPADM

## 2019-11-14 RX ORDER — NALOXONE HYDROCHLORIDE 0.4 MG/ML
.1-.4 INJECTION, SOLUTION INTRAMUSCULAR; INTRAVENOUS; SUBCUTANEOUS
Status: DISCONTINUED | OUTPATIENT
Start: 2019-11-14 | End: 2019-11-14

## 2019-11-14 RX ORDER — HEPARIN SODIUM 1000 [USP'U]/ML
3 INJECTION, SOLUTION INTRAVENOUS; SUBCUTANEOUS ONCE
Status: DISCONTINUED | OUTPATIENT
Start: 2019-11-14 | End: 2019-11-16 | Stop reason: HOSPADM

## 2019-11-14 RX ORDER — ANALGESIC BALM 1.74; 4.06 G/29G; G/29G
OINTMENT TOPICAL EVERY 6 HOURS PRN
Status: DISCONTINUED | OUTPATIENT
Start: 2019-11-14 | End: 2019-11-14

## 2019-11-14 RX ADMIN — ACETAMINOPHEN 975 MG: 325 TABLET, FILM COATED ORAL at 04:57

## 2019-11-14 RX ADMIN — ACETAMINOPHEN 975 MG: 325 TABLET, FILM COATED ORAL at 18:49

## 2019-11-14 RX ADMIN — OXYCODONE HYDROCHLORIDE 10 MG: 5 TABLET ORAL at 18:49

## 2019-11-14 RX ADMIN — AMLODIPINE BESYLATE 10 MG: 10 TABLET ORAL at 12:03

## 2019-11-14 RX ADMIN — OXYCODONE HYDROCHLORIDE 10 MG: 5 TABLET ORAL at 12:03

## 2019-11-14 RX ADMIN — ACETAMINOPHEN 975 MG: 325 TABLET, FILM COATED ORAL at 12:03

## 2019-11-14 RX ADMIN — OXYCODONE HYDROCHLORIDE 10 MG: 5 TABLET ORAL at 04:58

## 2019-11-14 ASSESSMENT — ACTIVITIES OF DAILY LIVING (ADL)
ADLS_ACUITY_SCORE: 13

## 2019-11-14 ASSESSMENT — MIFFLIN-ST. JEOR: SCORE: 2107.05

## 2019-11-14 NOTE — PLAN OF CARE
"Writer cared for pt [id number]. Patient alert and oriented. Vitally stable on ra. Up ad chiara. PIV SL. Walked outside for a few minutes at a time. Girlfriend spent the night by bedside. Patient notified charged RN when leaving unit. C/o of left hip pain and requested prn oxycodone and scheduled tylenol administered. Pt refused K+ redraw during the night and only agreed to be \"drawn once in the AM.\" Dr. Won Jewell updated of refusal. AM redraw for K+ resulted 4.9. Pt slept through the night. No significant changes to status.   R: Continue to monitor and implement poc.   "

## 2019-11-14 NOTE — PROGRESS NOTES
"MDs had just finished talking with him about the importance of staying. Per patient he is going to stay until this afternoon when decisions/plan have been made. Patient stated that he was \"just going to bring my stuff to the car.\"  Patient left via wheelchair with belongings accompanied by girlfriend. Patient left floor at 0825.   "

## 2019-11-14 NOTE — PROGRESS NOTES
Patient has been educated on potential risks of choosing to leave the unit and that the responsibility for patient well-being will belong to the patient. Pt has been informed that admission to hospital is due to need for medical treatment. Education given to the patient on some of the potential risks included but are not limited to:      - lack of access to nursing intervention      - possible missed appointments with MD, therapies, tests      - possible missed medications, antibiotics, management of IV's    Patient Response: Patient still said he was going outside but only to bring his belongings to his car    Patient notified staff prior to leaving unit: Yes, patient notified RN that he was going outside but stated he was just going to bring his belongings to the car  Coban wrap placed over IV prior to pt leaving unit: No

## 2019-11-14 NOTE — PLAN OF CARE
Status: Patient on 5B ERROL/AIN d/t rhabdomyolysis following 'black-out' d/t polysubstance abuse    Vitals: HTN, started Norvasc this AM  Neuros: Pt is A&Ox4, LLE weaker d/t hip pain  IV: R tunnled internal jugular for HD. L PIV SL  Resp/trach: LS clear on RA  Diet: Regular diet  Bowel status: Last BM 11/13  : Voided x1 this shift in urinal   Skin: Intact  Pain: L hip pain. Given scheduled Tylenol and PRN Oxycodone  Activity: Up independently. Leaving the unit multiple times during the day. Currently patient told writer he would be in the family lounge with his girlfriend.   Social: Girlfriend at bedside this AM. Discussed that she cannot use the shared bathroom or stay in the room overnight d/t being in an occupied double room  Plan: Continue to monitor and follow POC

## 2019-11-14 NOTE — PROVIDER NOTIFICATION
"Talked with patient and his SO about having a roommate.  I stated I was going to try to find a private, but can't guarantee it.  Due to the fact that he has a roommate she cannot use the bathroom or spend the night.  He said \"I'm leaving then.  I know better.  This is bullshit.\"  I notified Dr. Calabrese.  He stated they were rounding in about 20 min.   "

## 2019-11-14 NOTE — PROVIDER NOTIFICATION
"Paged MD that the patient is packing up and that the bedside RN \"Can't take this line out of me\" referring to his TC for dialysis.  "

## 2019-11-14 NOTE — PROGRESS NOTES
HEMODIALYSIS TREATMENT NOTE    Date: 11/13/2019  Time: 6:36 PM    Data:  Pre Wt: 110.3 kg   Desired Wt: 110.3 kg   Post Wt:  110.3 kg   Weight change:  0 kg  Ultrafiltration - Post Run Net Total Removed (mL): 0 mL  Vascular Access Status: patent  Dialyzer Rinse: streaked  Total Blood Volume Processed: 70.3 Liters  Total Dialysis (Treatment) Time: 3 Hours    Lab:   No    Interventions:  HD via tunneled RIJ CVC on K2/Ca2.5/Na138/Ulplky92 dialysate bath. 425 BFR throughout. No fluid removed. CVC saline locked and ClearGuard caps placed post-treatment. Report given to primary RN on 5B.    Assessment:  Alert & oriented. Able to make needs known. Pleasant and cooperative. Slightly hypertensive but did not meet parameters for PRN BP medications. All other VSS on room air. Ordered and ate small amount of dinner. Used urinal & had 400 mLs of urine output. Girlfriend and friends here for part of the time.       Plan:    Per renal team.

## 2019-11-14 NOTE — PLAN OF CARE
/98, other vitals stable.  Dialysis done this evening. Reports feeling tired after dialysis. Complains of pain at left hip area.  Oxycodone and Tylenol for pain management with adequate relief.  Appetite poor.  Outside once with visitor. Cooperative with nursing staff this evening.

## 2019-11-14 NOTE — PROGRESS NOTES
Nephrology progress note    Attempted to see the patient, however, he had left the building, then returned back for dialysis.   Tolerated HD well  Higher K levels likely 2/2 dietary factors.   Will continue to monitor him for renal recovery.     Nevin Mcgarry MD

## 2019-11-14 NOTE — PROGRESS NOTES
Pt not in room at change of shift. Belongings remain. Unclear how long pt has been absent from unit. Provider requesting to speak with pt, since pt unavailable, pt was overhead paged to return to room.

## 2019-11-14 NOTE — PLAN OF CARE
"Pt upset this AM d/t being notified that girlfriend cannot spend the night or use the bathroom in the room duet to being in a double room and having a roommate. Discussed with patient that the MDs will be coming to his room to speak with him shortly. Discussed with patient the importance of staying in the hospital to receive the necessary treatments that he needs. Explained that his girlfreind could stay in the family lounge during the night and use the pubic restroom, then she could spend all day with him. Patient still disagreeing with RN. Explained that we need to take out his R internal jugular catheter, pt refused and stated that \"I was billed for this, you are not taking it out.\"    "

## 2019-11-15 LAB
ANION GAP SERPL CALCULATED.3IONS-SCNC: 8 MMOL/L (ref 3–14)
BUN SERPL-MCNC: 45 MG/DL (ref 7–30)
CALCIUM SERPL-MCNC: 9.2 MG/DL (ref 8.5–10.1)
CHLORIDE SERPL-SCNC: 104 MMOL/L (ref 94–109)
CO2 SERPL-SCNC: 26 MMOL/L (ref 20–32)
CREAT SERPL-MCNC: 5.15 MG/DL (ref 0.66–1.25)
ERYTHROCYTE [DISTWIDTH] IN BLOOD BY AUTOMATED COUNT: 13.1 % (ref 10–15)
GFR SERPL CREATININE-BSD FRML MDRD: 14 ML/MIN/{1.73_M2}
GLUCOSE SERPL-MCNC: 82 MG/DL (ref 70–99)
HCT VFR BLD AUTO: 36.3 % (ref 40–53)
HGB BLD-MCNC: 11.5 G/DL (ref 13.3–17.7)
MCH RBC QN AUTO: 29 PG (ref 26.5–33)
MCHC RBC AUTO-ENTMCNC: 31.7 G/DL (ref 31.5–36.5)
MCV RBC AUTO: 92 FL (ref 78–100)
PLATELET # BLD AUTO: 297 10E9/L (ref 150–450)
POTASSIUM SERPL-SCNC: 4.7 MMOL/L (ref 3.4–5.3)
RBC # BLD AUTO: 3.96 10E12/L (ref 4.4–5.9)
SODIUM SERPL-SCNC: 138 MMOL/L (ref 133–144)
WBC # BLD AUTO: 10.3 10E9/L (ref 4–11)

## 2019-11-15 PROCEDURE — 85027 COMPLETE CBC AUTOMATED: CPT | Performed by: STUDENT IN AN ORGANIZED HEALTH CARE EDUCATION/TRAINING PROGRAM

## 2019-11-15 PROCEDURE — 12000001 ZZH R&B MED SURG/OB UMMC

## 2019-11-15 PROCEDURE — 99233 SBSQ HOSP IP/OBS HIGH 50: CPT | Performed by: INTERNAL MEDICINE

## 2019-11-15 PROCEDURE — 25000132 ZZH RX MED GY IP 250 OP 250 PS 637: Performed by: STUDENT IN AN ORGANIZED HEALTH CARE EDUCATION/TRAINING PROGRAM

## 2019-11-15 PROCEDURE — 80048 BASIC METABOLIC PNL TOTAL CA: CPT | Performed by: STUDENT IN AN ORGANIZED HEALTH CARE EDUCATION/TRAINING PROGRAM

## 2019-11-15 PROCEDURE — 36415 COLL VENOUS BLD VENIPUNCTURE: CPT | Performed by: STUDENT IN AN ORGANIZED HEALTH CARE EDUCATION/TRAINING PROGRAM

## 2019-11-15 RX ADMIN — ACETAMINOPHEN 975 MG: 325 TABLET, FILM COATED ORAL at 08:19

## 2019-11-15 RX ADMIN — AMLODIPINE BESYLATE 10 MG: 10 TABLET ORAL at 08:19

## 2019-11-15 RX ADMIN — ACETAMINOPHEN 975 MG: 325 TABLET, FILM COATED ORAL at 01:15

## 2019-11-15 RX ADMIN — ACETAMINOPHEN 975 MG: 325 TABLET, FILM COATED ORAL at 14:07

## 2019-11-15 RX ADMIN — OXYCODONE HYDROCHLORIDE 10 MG: 5 TABLET ORAL at 08:19

## 2019-11-15 RX ADMIN — OXYCODONE HYDROCHLORIDE 10 MG: 5 TABLET ORAL at 21:42

## 2019-11-15 RX ADMIN — OXYCODONE HYDROCHLORIDE 10 MG: 5 TABLET ORAL at 01:15

## 2019-11-15 RX ADMIN — ACETAMINOPHEN 975 MG: 325 TABLET, FILM COATED ORAL at 21:42

## 2019-11-15 RX ADMIN — OXYCODONE HYDROCHLORIDE 10 MG: 5 TABLET ORAL at 14:08

## 2019-11-15 ASSESSMENT — ACTIVITIES OF DAILY LIVING (ADL)
ADLS_ACUITY_SCORE: 14
ADLS_ACUITY_SCORE: 14
ADLS_ACUITY_SCORE: 13
ADLS_ACUITY_SCORE: 14
ADLS_ACUITY_SCORE: 13
ADLS_ACUITY_SCORE: 14

## 2019-11-15 NOTE — PLAN OF CARE
"Vital signs stable on Ra. Up self in room and halls. Leaving floor to go to cafeteria to eat. Pt declining to save urine but reports that his voiding is \"back to normal\". Educated pt on I&O and encouraged to save urine. No orders for dialysis today. Prn medication given for leg and back pain. Heat applied. Will continue to monitor.       Pt left unit around 1500 and had not returned by 1930. All belongings left in room. Pt called x2 with no answer. Pt does not have voicemail set up. MD's paged. Will continue to monitor.   "

## 2019-11-15 NOTE — PLAN OF CARE
Patient has been educated on potential risks of choosing to leave the unit and that the responsibility for patient well-being will belong to the patient. Pt has been informed that admission to hospital is due to need for medical treatment. Education given to the patient on some of the potential risks included but are not limited to:      - lack of access to nursing intervention      - possible missed appointments with MD, therapies, tests      - possible missed medications, antibiotics, management of IV's    Patient Response: Patient stated upon returning that he went outside to smoke for a few minutes. Was off unit for half an hour.    Patient notified staff prior to leaving unit: No.  Coban wrap placed over IV prior to pt leaving unit: No.

## 2019-11-15 NOTE — PROGRESS NOTES
"Brief Care Coordination Note    Writer met with patient to discuss discharge planning/needs. Patient does not have active insurance. Financial counseling has met with the patient multiple times to assist with application. Per FC notes, patient has been informed of the next steps need in his insurance application through [employer]. Patient states that he knows he needs to get insurance and that his sister helped with the application and didn't know what she was doing, just said he needed to complete it himself. Writer encouraged patient to log in and complete his application using the computers accessible to patients. Writer reiterated that the hospital would not be able to safely discharge the patient without insurance if he is needing hemodialysis and OP follow up. Patient stated, \"will I be stuck here all weekend then?\" Writer reiterated that at this time, without insurance and need for hemodialysis, it was not safe to discharge from the hospital. RNCC will continue to follow for discharge planning.     Elisa Batista, DAVID, BSN    [facility]  [address] [place] [id number]    [email or web address]  [email or web address]    Office: [phone] Pager: [phone]  To contact weekend RNCC, dial [phone] and enter pager number [phone] at prompt. This pager can not be contacted by text page or outside line.        "

## 2019-11-15 NOTE — PROGRESS NOTES
Orthopaedic Surgery Progress Note 11/14/2019    Orthopedics asked to re-evaluate patient for left gluteal pain in the setting of suspected evolved compartment syndrome.  For specific presenting patient history please refer to consult note from 11/6 and subsequent progress notes last week.    Events of the week reviewed, laboratory data reviewed, imaging reviewed.       S: Continues to have left buttock pain.  Patient actually reports that he thinks he is getting better, but the pain persists.  Worse with lying on that side.  Worse with palpation into the buttocks.  Is using crutches for ambulation.  Has been noted to be off the floor for extended periods of time.  Denies numbness or tingling. Denies chest pain, SOB, nausea/vomiting.  Afebrile.  No groin pain.    O:  Temp: 97.8  F (36.6  C) Temp src: Oral BP: (!) 166/89 Pulse: 74 Heart Rate: 84 Resp: 16 SpO2: 99 % O2 Device: None (Room air)      Exam:  Gen: No acute distress, resting comfortably in bed.  Sleeping on arrival.  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      Picture consistent with evolved compartment syndrome  No concern for infection or septic joint at this time  No surgical indications noted  CK improving, potassium back to normal    No consistent with active compartment syndrome    Plan:  Medicine Primary  Activity: Up with assist.  Weight bearing status:  WBAT.  Bracing/Splinting: None  Elevation: Position to comfort  Pain management: Transition from IV to orals as tolerated.   X-rays: No additional imaging neccessary  Physical Therapy: Mobilization, ROM, ADL's.  Occupational Therapy: ADL's.  Follow-up: No dedicated Orthopedics follow up needed unless problems persist    Disposition: Per primary team pending medical course        Isaiah Ayala MD  Orthopaedic Surgery PGY-4  Pager [phone]    Please page me directly with any questions/concerns during regular weekday hours before 5pm. If there is no response, if it is a weekend, or if it is during evening hours then please page the orthopaedic surgery resident on call.

## 2019-11-15 NOTE — PLAN OF CARE
Writer cared for pt [id number]. Patient alert and oriented. Vitally stable on ra. Up ad chiara. PIV SL. Went outside to smoke a couple of times during the night. Daily safety note completed. Girlfriend spent the night by bedside. C/o of left hip pain and requested prn oxycodone and scheduled tylenol administered. No significant changes to status overnight. Pt slept through the night.  R: Continue to monitor and implement poc.

## 2019-11-15 NOTE — PROGRESS NOTES
[facility], [place]    Progress Note - Ting Cabello Service        Date of Admission:  11/5/2019    Changes today:   - Dialysis today. No changes    Assessment & Plan   28 y/o man who presented following a 'black-out' d/t polysubstance abuse w/new left hip pain which started Sunday morning found to have severe rhabdomyolysis with subsequent ERROL    ACUTE RENAL FAILURE (RIFLE III) d/t RHABDOMYOLYSIS with ATN   CK 44K w/anephric ERROL RIFLE stage III likely 2/2 ATN from rhabdomyolysis. Initially requiring RRT then iHD. Now kidney function showing signs of possible recovery with good UOP and stable K+ and only slight rise of Cr from 11/14 to 11/15 despite no HD. No insurance thus OP hemodialysis will be difficult to arrange.   - Nephrology following  - Continue HD. Currently MWF schedule but has needed additional runs to hyperkalemia  - Unclear at this point potential for recovery,though hopeful with increased UOP  - Strict I/O    MECHANICAL FALL   ACUTE LEFT HIP PAIN   No fx on imaging. Initial concern for compartment syndrome. Evaluated by vascular and orthopedic surgery. No evidence of compartment syndrome. Appreciate surgeries expletives.  Initially with left hip/leg pain attributed to rhabdo. Now with worsening pain and swelling over left hip/buttock. Concern for fluid collection/hematoma based on exam. CT showed severe muscular edema. Ortho consulted and recommended no new interventions. CK trended down nicely.   - Oxycodone 5-10 mg Q6h PRN   - Scheduled Tylenol  - Lidocaine patch qAM     HTN  Likely related to renal failure. Improved control  - Amlodipine 10 mg daily      POLYSUBSTANCE ABUSE   Tox screen + Amphetamines, Cannabinoids. Patient wants time before he talks to chem dep. He reports that it was a one time incident where got too drunk. He feels like this was a wakeup call.     Dispo:   To home once HD outpatient plan established or pt no longer needing HD.           Diet: Room  Service  Regular Diet Adult  Snacks/Supplements Adult: Nepro Oral Supplement; Between Meals    Fluids: None PO intake   Lines: Tunneled line   DVT Prophylaxis: Ambulate every shift  Jaramillo Catheter: not present  Code Status: Full Code      Disposition Plan   Expected discharge: 4 - 7 days, recommended to prior living arrangement once insurance and HD plans sorted out versus renal recovery to the point of not needing dialysis.  Entered: Dustin Orellana MD 11/15/2019, 1:45 PM     Ryne Orellana MD   of Medicine  Med-Peds Hospitalist  Pager [phone]  __________________________________________________________________    Interval History   Care team notes reviewed. Complains of worsening pain in left hip. Also worsened swelling. No fevers. Good PO intake. Increasing UOP. Pt left unit for several hours yesterday but did return. This am is very upset that he has a roommate so his SO can no longer stay in room or use bathroom.     4 pt ROS neg other than noted above    Data reviewed today: I reviewed all medications, new labs and imaging results over the last 24 hours.     Physical Exam   Vital Signs: Temp: 97.7  F (36.5  C) Temp src: Oral BP: 132/74 Pulse: 84   Resp: 18 SpO2: 99 % O2 Device: None (Room air)    Weight: 241 lbs 0 oz  General Appearance: Well appearing male resting in bed, no distress, tattos present   Respiratory: CTAB  Cardiovascular: RRR, no MRG  GI: soft, NT, +BS  MSK: Left hip with skin tightness, swelling and tenderness as well as some fluctuance.

## 2019-11-15 NOTE — PLAN OF CARE
Pt off unit until after 6pm. Mostly non cooperative. Refused lab draw, VS. Continues to c/o left hip pain. Oxycodone administered x1. Pt noted to be sleeping in bed after pain medication administration. Ambulated to BR and around room with walker. Ortho team saw pt this evening. Assess pain and effectiveness of interventions. Encourage participation in plan of care.

## 2019-11-16 VITALS
RESPIRATION RATE: 18 BRPM | SYSTOLIC BLOOD PRESSURE: 151 MMHG | TEMPERATURE: 99.2 F | OXYGEN SATURATION: 98 % | HEIGHT: 73 IN | WEIGHT: 241 LBS | BODY MASS INDEX: 31.94 KG/M2 | DIASTOLIC BLOOD PRESSURE: 99 MMHG | HEART RATE: 84 BPM

## 2019-11-16 LAB
ANION GAP SERPL CALCULATED.3IONS-SCNC: 6 MMOL/L (ref 3–14)
BUN SERPL-MCNC: 38 MG/DL (ref 7–30)
CALCIUM SERPL-MCNC: 9.2 MG/DL (ref 8.5–10.1)
CHLORIDE SERPL-SCNC: 105 MMOL/L (ref 94–109)
CO2 SERPL-SCNC: 28 MMOL/L (ref 20–32)
CREAT SERPL-MCNC: 4.44 MG/DL (ref 0.66–1.25)
ERYTHROCYTE [DISTWIDTH] IN BLOOD BY AUTOMATED COUNT: 12.9 % (ref 10–15)
GFR SERPL CREATININE-BSD FRML MDRD: 17 ML/MIN/{1.73_M2}
GLUCOSE SERPL-MCNC: 88 MG/DL (ref 70–99)
HCT VFR BLD AUTO: 36.9 % (ref 40–53)
HGB BLD-MCNC: 12.1 G/DL (ref 13.3–17.7)
MCH RBC QN AUTO: 29.7 PG (ref 26.5–33)
MCHC RBC AUTO-ENTMCNC: 32.8 G/DL (ref 31.5–36.5)
MCV RBC AUTO: 91 FL (ref 78–100)
PLATELET # BLD AUTO: 331 10E9/L (ref 150–450)
POTASSIUM SERPL-SCNC: 4.7 MMOL/L (ref 3.4–5.3)
RBC # BLD AUTO: 4.07 10E12/L (ref 4.4–5.9)
SODIUM SERPL-SCNC: 139 MMOL/L (ref 133–144)
WBC # BLD AUTO: 10.3 10E9/L (ref 4–11)

## 2019-11-16 PROCEDURE — 25000132 ZZH RX MED GY IP 250 OP 250 PS 637: Performed by: STUDENT IN AN ORGANIZED HEALTH CARE EDUCATION/TRAINING PROGRAM

## 2019-11-16 PROCEDURE — 99239 HOSP IP/OBS DSCHRG MGMT >30: CPT | Mod: GC | Performed by: INTERNAL MEDICINE

## 2019-11-16 PROCEDURE — 80048 BASIC METABOLIC PNL TOTAL CA: CPT | Performed by: STUDENT IN AN ORGANIZED HEALTH CARE EDUCATION/TRAINING PROGRAM

## 2019-11-16 PROCEDURE — 25800030 ZZH RX IP 258 OP 636: Performed by: INTERNAL MEDICINE

## 2019-11-16 PROCEDURE — 36415 COLL VENOUS BLD VENIPUNCTURE: CPT | Performed by: STUDENT IN AN ORGANIZED HEALTH CARE EDUCATION/TRAINING PROGRAM

## 2019-11-16 PROCEDURE — 40000141 ZZH STATISTIC PERIPHERAL IV START W/O US GUIDANCE

## 2019-11-16 PROCEDURE — 85027 COMPLETE CBC AUTOMATED: CPT | Performed by: STUDENT IN AN ORGANIZED HEALTH CARE EDUCATION/TRAINING PROGRAM

## 2019-11-16 RX ORDER — OXYCODONE HYDROCHLORIDE 5 MG/1
5-10 TABLET ORAL EVERY 6 HOURS PRN
Qty: 30 TABLET | Refills: 0 | Status: SHIPPED | OUTPATIENT
Start: 2019-11-16

## 2019-11-16 RX ORDER — AMLODIPINE BESYLATE 10 MG/1
10 TABLET ORAL DAILY
Qty: 30 TABLET | Refills: 0 | Status: SHIPPED | OUTPATIENT
Start: 2019-11-17

## 2019-11-16 RX ADMIN — TIZANIDINE 4 MG: 4 TABLET ORAL at 13:30

## 2019-11-16 RX ADMIN — OXYCODONE HYDROCHLORIDE 10 MG: 5 TABLET ORAL at 12:12

## 2019-11-16 RX ADMIN — ACETAMINOPHEN 975 MG: 325 TABLET, FILM COATED ORAL at 05:40

## 2019-11-16 RX ADMIN — OXYCODONE HYDROCHLORIDE 10 MG: 5 TABLET ORAL at 05:41

## 2019-11-16 RX ADMIN — ACETAMINOPHEN 975 MG: 325 TABLET, FILM COATED ORAL at 12:12

## 2019-11-16 RX ADMIN — SODIUM CHLORIDE, POTASSIUM CHLORIDE, SODIUM LACTATE AND CALCIUM CHLORIDE 2000 ML: 600; 310; 30; 20 INJECTION, SOLUTION INTRAVENOUS at 12:14

## 2019-11-16 RX ADMIN — AMLODIPINE BESYLATE 10 MG: 10 TABLET ORAL at 08:00

## 2019-11-16 ASSESSMENT — ACTIVITIES OF DAILY LIVING (ADL)
ADLS_ACUITY_SCORE: 13

## 2019-11-16 NOTE — PLAN OF CARE
After pt returned to unit around 2130, he reported constant pain. 10 mg oxycodone and Tylenol given x2. Pt reported that oxycodone barely takes the edge off the pain. CC paged for PRN dose of different pain medication, writer instructed to redirect pt instead. Pt appears to be resting somewhat comfortably with girlfriend in bed.  L PIV removed at pt's insistence d/t it itching, after pt claimed he would remove it himself. Pt up independent. Refused vitals and am lab. No output tonight.

## 2019-11-16 NOTE — PROGRESS NOTES
Patient has been educated on potential risks of choosing to leave the unit and that the responsibility for patient well-being will belong to the patient. Pt has been informed that admission to hospital is due to need for medical treatment. Education given to the patient on some of the potential risks included but are not limited to:      - lack of access to nursing intervention      - possible missed appointments with MD, therapies, tests      - possible missed medications, antibiotics, management of IV's    Patient Response: Pt said he understood why he was here.      Patient notified staff prior to leaving unit: Pt did not notify staff  Coban wrap placed over IV prior to pt leaving unit- Pt did not notify staff.

## 2019-11-16 NOTE — PROGRESS NOTES
Pt discharged from facility to home. Dialysis line intact upon discharge. Pt educated on site cares. Pt and family educated on medications, follow up appointments, and further care needed. Pt agreed to go to the set appointments to have his labs drawn. All belongings in room taken by pt and family.

## 2019-11-16 NOTE — PROGRESS NOTES
Attempted to see patient in room - he was not there         Shankar Mendieta MD  Nephrology Fellow   Pager [phone]  [facility]

## 2019-11-17 ENCOUNTER — TELEPHONE (OUTPATIENT)
Dept: NURSING | Facility: CLINIC | Age: 29
End: 2019-11-17

## 2019-11-17 ENCOUNTER — PATIENT OUTREACH (OUTPATIENT)
Dept: CARE COORDINATION | Facility: CLINIC | Age: 29
End: 2019-11-17

## 2019-11-17 NOTE — DISCHARGE SUMMARY
[facility] [place]  Discharge Summary - Medicine & Pediatrics       Date of Admission:  11/5/2019  Date of Discharge:  11/16/2019  2:51 PM  Discharging Provider: Ryne Orellana MD  [facility]    Discharge Diagnoses   Acute Renal Failure with Hemodialysis Dependence   Rhabdomyolysis   Polysubstance use disorder     Follow-ups Needed After Discharge    1) Primary Care physician within 7 days.     2) You will need to have labs drawn at [facility] on Monday,   Wednesday and Friday. These are ordered and routed to ,    and  as well as . If your labs come back and   are abnormal, you may need to be seen emergently for possible dialysis.   However, if they are normal and continue to improve then you will need   your hemodialysis line removed in 1-2 weeks.     3) TENTATIVELY -  Interventional Radiology during week of 11/25/19 for   possible hemodialysis line removal.     Discharge Disposition   Discharged to home  Condition at discharge: Stable    Hospital Course   Sawyer Walters was admitted on 11/5/2019. Patient has a history of polysubstance use disorder and presented after black out episode, admitted with left hip pain found to have severe rhabdomyolysis with subsequent Acute Renal Failure with ATN     ACUTE RENAL FAILURE (RIFLE III) d/t RHABDOMYOLYSIS with ATN   CK 44K w/anephric ERROL RIFLE stage III likely 2/2 ATN from rhabdomyolysis. Initially requiring RRT then iHD. Tuunneled line placed for ongoing hemodialysis needs however on day of discharge patient's Cr improved without dialysis the day prior and potassium well controlled.   Furthermore, patient has no insurance thus on day of discharge was undergoing financial counseling and planning on applying for insurance.   On discharge patient was instructed to check labs Monday (11/18), Wed (11/20) and Friday (11/22) to ensure appropriate renal recovery.   Tunneled line remained  in place.   ---   - If Cr did not show recovery / if potassium were to be elevated patient will likley require iHD thus will likely need to return to the ED    - If he continues to have renal recovery, tunneled line will need to be removed week of 12/2/19    MECHANICAL FALL   ACUTE LEFT HIP PAIN   No fx on imaging. Initial concern for compartment syndrome. Evaluated by vascular and orthopedic surgery and ultimately no surgical intervention recommended.   CK trended down appropriately.   - Oxycodone 5-10 mg Q6h PRN (discharged with 30 tabs)     HTN  Rellated to renal failure.   - Amlodipine 10 mg daily      POLYSUBSTANCE ABUSE   Tox screen + Amphetamines, Cannabinoids. Patient  declined chem dep a number of times while inpatient.     The patient was discussed with MD Amy Bauman[address], [place]  Pager: [phone]    Consultations This Hospital Stay   NEPHROLOGY GENERAL ADULT IP CONSULT  ORTHOPAEDIC SURGERY ADULT/PEDS IP CONSULT    Code Status   Prior   ______________________________________________________________________    Physical Exam   Vital Signs:                   Weight: 241 lbs 0 oz  General Appearance: Well appearing male with a number of tattoos over neck and arms   Respiratory: clear lung sounds BL   Cardiovascular: RRR, no mrg   GI: soft, nt and nd   Skin: warm and well perfused      Primary Care Physician   Physician No Ref-Primary    Discharge Orders      Basic metabolic panel     Basic metabolic panel     Basic metabolic panel     Adult [facility] Follow-up and recommended labs and tests    1) Primary Care physician within 7 days.   2) You will need to get labs drawn at [address] on Monday, Wednesday and Friday. THese are ordered and routed  To ,  and  as well as . If your labs come back and are abnormal, you may need to be seen emergently for possible dialysis. However, if they are  normal and continue to improve then you will need your hemodialysis line removed in 1-2 weeks.   3) TENTATIVELY -  Interventional Radiology during week of 11/25/19 for possible hemodialysis line removal.       Appointments on [facility] and/or [facility] (with [facility] or [facility] provider or service). Call [phone] if you haven't heard regarding these appointments within 7 days of discharge.     Activity    Your activity upon discharge: activity as tolerated     IV access    **Ordering Provider MUST call/page Care Coordinator/ to discuss arranging this service**    You are going home with the following vascular access device: Hemodialysis.     Reason for your hospital stay    You were admitted with a condition called rhabdomyolysis and renal failure. This means your kidneys failed and you required dialysis to help with the electrolyte level in your body as well as to ensure toxins did not build up.     On day of discharge your kidneys showed some improvement. Thus we felt it was safe to discharge you to home.     However, it is CRITICAL that you drink plenty of water. If you feel dehydrated, lightheaded, please drink more water and return to the Emergency Department.   It is also CRITICAL that you have labs drawn on Monday (11/18/19), Wednesday (11/20/19) and Friday (11/22/19) of this week. These labs will be sent to the doctors who say you while you were in the hospital.   Please keep your phone charged and on your person; if these labs return abnormal, we will need to contact you for possible repeat hemodialysis or to be seen in the clinic or emergency department.     Follow up instructions are as below     Diet    It is critical you follow a LOW POTASSIUM diet.     Discharge Medications   Discharge Medication List as of 11/16/2019 11:12 AM      START taking these medications    Details   amLODIPine (NORVASC) 10 MG tablet Take 1 tablet (10 mg) by mouth daily, Disp-30 tablet, R-0, E-Prescribe       camphor-menthol-methyl sal 4-10-30 % CREA Apply topically every 6 hours as needed (pain in left hip)Disp-57 g, [id number]      oxyCODONE (ROXICODONE) 5 MG tablet Take 1-2 tablets (5-10 mg) by mouth every 6 hours as needed for moderate to severe pain, Disp-30 tablet, R-0, Local Print      tiZANidine (ZANAFLEX) 4 MG tablet Take 1 tablet (4 mg) by mouth every 6 hours as needed for muscle spasms, Disp-30 tablet, R-0, E-Prescribe           Allergies   No Known Allergies

## 2019-11-18 NOTE — PROGRESS NOTES
[facility]: Post-Discharge Note  SITUATION                                                      Admission:    Admission Date: 11/05/19   Reason for Admission: Acute Renal Failure with Hemodialysis Dependence   Discharge:   Discharge Date: 11/16/19  Discharge Diagnosis: Acute Renal Failure with Hemodialysis Dependence   Discharge Service: Medicine and Pediatrics     BACKGROUND                                                      Sawyer Walters was admitted on 11/5/2019. Patient has a history of polysubstance use disorder and presented after black out episode, admitted with left hip pain found to have severe rhabdomyolysis with subsequent Acute Renal Failure with ATN     ASSESSMENT      Discharge Assessment  Patient reports symptoms are: Unchanged  Does the patient have all of their medications?: Yes  Does patient know what their new medications are for?: Yes  Does patient have a follow-up appointment scheduled?: No(Patient was given information for [place] [facility] and was encouraged to call and schedule an appointment ASAP. )  Does patient have any other questions or concerns?: No    Post-op  Did the patient have surgery or a procedure: No  Fever: No  Chills: No  Eating & Drinking: eating and drinking without complaints/concerns  PO Intake: other(low potassium diet)  Bowel Function: normal  Urinary Status: voiding without complaint/concerns    PLAN                                                      Outpatient Plan:      1) Primary Care physician within 7 days.      2) You will need to have labs drawn at [facility] on Monday,   Wednesday and Friday. These are ordered and routed to ,    and  as well as . If your labs come back and   are abnormal, you may need to be seen emergently for possible dialysis.   However, if they are normal and continue to improve then you will need   your hemodialysis line removed in 1-2 weeks.      3) TENTATIVELY -   Interventional Radiology during week of 11/25/19 for   possible hemodialysis line removal.     No future appointments.        Bronwyn Riley, CMA

## 2019-11-18 NOTE — TELEPHONE ENCOUNTER
Patient calls about lab-work and appointment for tomorrow, RN unable to find an appointment listed, no orders found on yesterdays record. Patient unable to find his discharge papers.  He requests transfer to Unit 5 at [place] to speak with a nurse. RN offered warm transfer to the  at [place], he declined, has unit phone number on his cell, will call himself. Call ended.    Emilie Mckay RN - [facility] Nurse Advisor  11/17/2019  11:47PM

## 2019-11-20 DIAGNOSIS — N17.9 ACUTE RENAL FAILURE, UNSPECIFIED ACUTE RENAL FAILURE TYPE (H): ICD-10-CM

## 2019-11-20 DIAGNOSIS — T79.6XXA TRAUMATIC RHABDOMYOLYSIS, INITIAL ENCOUNTER (H): ICD-10-CM

## 2019-11-20 LAB
ANION GAP SERPL CALCULATED.3IONS-SCNC: 4 MMOL/L (ref 3–14)
BUN SERPL-MCNC: 28 MG/DL (ref 7–30)
CALCIUM SERPL-MCNC: 9.1 MG/DL (ref 8.5–10.1)
CHLORIDE SERPL-SCNC: 105 MMOL/L (ref 94–109)
CO2 SERPL-SCNC: 29 MMOL/L (ref 20–32)
CREAT SERPL-MCNC: 1.93 MG/DL (ref 0.66–1.25)
GFR SERPL CREATININE-BSD FRML MDRD: 45 ML/MIN/{1.73_M2}
GLUCOSE SERPL-MCNC: 91 MG/DL (ref 70–99)
POTASSIUM SERPL-SCNC: 4.1 MMOL/L (ref 3.4–5.3)
SODIUM SERPL-SCNC: 139 MMOL/L (ref 133–144)

## 2019-11-21 DIAGNOSIS — T79.6XXA TRAUMATIC RHABDOMYOLYSIS, INITIAL ENCOUNTER (H): Primary | ICD-10-CM

## 2019-11-21 NOTE — PROGRESS NOTES
Pt with ongoing renal recovery. Discussed with him, he is feeling much better.     IR consult placed for TDC removal.     Nathen Dubon MD on 11/21/2019 at 10:49 AM

## 2019-11-23 ENCOUNTER — TELEPHONE (OUTPATIENT)
Dept: NEPHROLOGY | Facility: CLINIC | Age: 29
End: 2019-11-23

## 2019-11-23 DIAGNOSIS — T79.6XXA TRAUMATIC RHABDOMYOLYSIS, INITIAL ENCOUNTER (H): Primary | ICD-10-CM

## 2019-11-23 NOTE — TELEPHONE ENCOUNTER
[facility] Call Center    Phone Message    May a detailed message be left on voicemail: no    Reason for Call: Other: pt has a question on when and where he can get this tube out . Please call pt to discuss further.     Action Taken: Message routed to:  [facility] Nephrology

## 2019-11-26 NOTE — TELEPHONE ENCOUNTER
CVC removal order placed.   Scheduled for 12/4/19 at 1130 [place] at [facility].   Communicated to patients day and time.  Will schedule follow up with Dr. Mendieta Alexander 15 at 130 with labs before.  Nicole Ltot LPN  Nephrology  [phone]

## 2019-12-04 NOTE — TELEPHONE ENCOUNTER
Received call from IR that patient did not show up for tunneled line removal today. Unable to reach patient. Will try again and update Dr. Dubon.  Nicole Lott LPN  Nephrology  [phone]

## 2019-12-06 NOTE — TELEPHONE ENCOUNTER
Attempted to call patient on girlfriends phone again regarding CVC removal. Unable to leave voice message. Voice messages are full.  Nicole Lott LPN  Nephrology  [phone]

## 2019-12-10 ENCOUNTER — TELEPHONE (OUTPATIENT)
Dept: NEPHROLOGY | Facility: CLINIC | Age: 29
End: 2019-12-10

## 2019-12-10 NOTE — TELEPHONE ENCOUNTER
Attempted to reach out to patient using girlfriends phone number as patient lost his phone. Unable to leave a message. Attempting to reschedule tunneled line removal post hospital discharge 11/16/19.  Nicole Lott LPN  Nephrology  [phone]

## 2019-12-17 ENCOUNTER — TELEPHONE (OUTPATIENT)
Dept: NEPHROLOGY | Facility: CLINIC | Age: 29
End: 2019-12-17

## 2019-12-17 ENCOUNTER — TELEPHONE (OUTPATIENT)
Dept: LAB | Facility: CLINIC | Age: 29
End: 2019-12-17

## 2019-12-17 DIAGNOSIS — Z01.818 PREOPERATIVE EXAMINATION: Primary | ICD-10-CM

## 2019-12-17 NOTE — TELEPHONE ENCOUNTER
Writer spoke to patients girlfriend, explained we need to reschedule the CVC removal right away as patient missed lat scheduled CVC removal 11/23. Write has been unable to reach patient (via phone) and unable to reach him on girlfriends phone until TODAY.  Scheduled IR CVC tunneled line removal for tomorrow at 845 am check in with 9 am procedure time here at [facility], check in on 1st floor. This was communicated to patients girlfriend a few times who verbalized understanding.  Nicole Lott LPN  Nephrology  [phone]

## 2019-12-18 ENCOUNTER — TELEPHONE (OUTPATIENT)
Dept: NEPHROLOGY | Facility: CLINIC | Age: 29
End: 2019-12-18

## 2019-12-18 NOTE — TELEPHONE ENCOUNTER
Writer has tried to contact patients girlfriend's phone again as patients phone is not working. Unable to reach patient. Patient not here for tunneled line removal.  Nicole Lott LPN  Nephrology  [phone]

## 2019-12-18 NOTE — TELEPHONE ENCOUNTER
Spoke to Jovita (patients girlfriend), patient was in the shower, trying to call IR to reschedule. Writer called IR to help, rescheduled for tomorrow at 930 am for removal and 830 for labs. That was communicated again to Jovita, patient in the back ground. She confirmed that they will be there tomorrow.  Nicole Lott LPN  Nephrology  [phone]

## 2019-12-19 NOTE — TELEPHONE ENCOUNTER
Spoke to patient (on girlfriends phone), he reports that he and Jovita are not speaking now and he didn't know what time to be at appointment for CVC removal today, he states he tried to call but missed appointment. Gave patient direct number for scheduling at IR. Rescheduled appointment for next Friday at 11 with check in time 1045. Patient aware. Encouraged him to call IR if there is a sooner appointment. Scheduled lab appointment prior, patient aware of this too.  Nicole Lott LPN  Nephrology  [phone]

## 2019-12-26 ENCOUNTER — TELEPHONE (OUTPATIENT)
Dept: NEPHROLOGY | Facility: CLINIC | Age: 29
End: 2019-12-26

## 2019-12-26 NOTE — TELEPHONE ENCOUNTER
Writer has attempted to call patient at numbers listed a few times to remind him of appointment for tomorrow. Phone numbers not allowing to leave messages.  Nicole Lott LPN  Nephrology  [phone]

## 2019-12-27 ENCOUNTER — ANCILLARY PROCEDURE (OUTPATIENT)
Dept: ULTRASOUND IMAGING | Facility: CLINIC | Age: 29
End: 2019-12-27

## 2019-12-27 DIAGNOSIS — T79.6XXA TRAUMATIC RHABDOMYOLYSIS, INITIAL ENCOUNTER (H): ICD-10-CM

## 2019-12-27 DIAGNOSIS — Z01.818 PREOPERATIVE EXAMINATION: ICD-10-CM

## 2019-12-27 LAB
INR PPP: 0.91 (ref 0.86–1.14)
PLATELET # BLD AUTO: 264 10E9/L (ref 150–450)

## 2019-12-27 RX ORDER — LIDOCAINE HYDROCHLORIDE 10 MG/ML
5 INJECTION, SOLUTION EPIDURAL; INFILTRATION; INTRACAUDAL; PERINEURAL ONCE
Status: COMPLETED | OUTPATIENT
Start: 2019-12-27 | End: 2019-12-27

## 2019-12-27 RX ADMIN — LIDOCAINE HYDROCHLORIDE 5 ML: 10 INJECTION, SOLUTION EPIDURAL; INFILTRATION; INTRACAUDAL; PERINEURAL at 11:24

## 2019-12-27 NOTE — DISCHARGE INSTRUCTIONS
A collaboration between [facility] and [facility]  Experts in minimally invasive, targeted treatments performed using imaging guidance    Venous Access Device, Port Catheter or Tunneled Central Line Removal    Today you had your existing venous access device removed, either because it was no longer needed or because there was malfunction or infection issues.    One of our Radiology PAs performed this procedure for you today:  ? Porfirio Longoria, [facility], PAR[facility]C  ? MIK Espinoza, PA-C  ? Jackson Santos PA-C  ? Felipa Pitts MS, PA-C  ? Sherif Russell PA-C    After you go home:  - Drink plenty of fluids.  Generally 6-8 (8 ounce) glasses a day is recommended.  - Resume your regular diet unless otherwise ordered by a medical provider.  - Keep any applied tape/gauze dressings clean and dry.  Change tape/gauze dressings if they get wet or soiled.  - You may shower the following day after procedure, however cover and protect from moisture any tape/gauze dressings.  You may let water hit and run over dried skin glue, but do not scrub.  Pat the area dry after showering.  - Port removal incisions are closed with absorbable suture, meaning they do not need to be removed at a later date, and a topical skin adhesive (skin glue).  This glue will wear off in 7-14 days.  Do not remove before this time.  If 14 days have passed and residual glue is present, you may gently remove it.  - You may remove tape/gauze dressings after 5 days if the site looks closed and in the process of healing.  - Do not apply gels, lotions, or ointments to the glue site for the first 10 days as this may cause the glue to prematurely soften and fail.  - Do not perform strenuous activities or lift greater than 10 pounds for the next three days.  - If there is bleeding or oozing from the procedure site, apply firm pressure to the area for 5-10 minutes.  If the bleeding continues seek medical advice at the  numbers below.  - Mild procedure site discomfort can be treated with an ice pack and over-the-counter pain relievers.              For 24 hours after any sedation used:  - Relax and take it easy.  No strenuous activities.  - Do not drive or operate machines at home or at work.  - No alcohol consumption.  - Do not make any important or legal decisions.    Call our Interventional Radiology (IR) service if:  - If you start bleeding from the procedure site.  If you do start to bleed from the site, lie down and hold some pressure on the site.  Our radiology provider can help you decide if you need to return to the hospital.  - If you have new or worsening pain related to the procedure.  - If you have concerning swelling at the procedure site.  - If you develop persistent nausea or vomiting.  - If you develop hives or a rash or any unexplained itching.  - If you have a fever of greater than 100.5  F and chills in the first 5 days after procedure.  - Any other concerns related to your procedure.      [facility]  Interventional Radiology (IR)  [address] [place] [address]    Contact Number:  [phone]  (IR control desk)  - Monday - Friday 8:00 am - 4:30 pm    After hours for urgent concerns:  [phone]  - After 4:30 pm Monday - Friday, Weekends and Holidays.   - Ask for Interventional Radiology on-call.  Someone is available 24 hours a day.  - [facility] toll free number:  [phone]

## 2019-12-27 NOTE — TELEPHONE ENCOUNTER
Attempts made to reach patient, unable to reach patient.  Nicole Lott LPN  Nephrology  [phone]

## 2019-12-27 NOTE — PROGRESS NOTES
Interventional Radiology Brief Post Procedure Note    Procedure: Tunneled CVC Removal    Proceduralist: Felipa Pitts PA-C    Assistant: None    Time Out: Prior to the start of the procedure and with procedural staff participation, I verbally confirmed the patient s identity using two indicators, relevant allergies, that the procedure was appropriate and matched the consent or emergent situation, and that the correct equipment/implants were available. Immediately prior to starting the procedure I conducted the Time Out with the procedural staff and re-confirmed the patient s name, procedure, and site/side. (The Joint Commission universal protocol was followed.)  Yes    Medications   Medication Event Details Admin User Admin Time       Sedation: None. Local Anesthestic used    Findings: Completed removal of right tunneled CVC in its entirety.    Estimated Blood Loss: Minimal    Fluoroscopy Time:  minute(s)    SPECIMENS: None    Complications: 1. None     Condition: Stable    Plan: Patient left on his own before I could reevaluate him and I did not think he was safe to leave and drive at the time he left. He was sleepy, lightheaded, and nauseous. He would take about ten steps then state he was lightheaded and needed to sit down. He was A&O x 4. Primary team notified.     Comments: See dictated procedure note for full details.    Felipa Pitts PA-C

## 2020-01-08 DIAGNOSIS — T79.6XXA TRAUMATIC RHABDOMYOLYSIS, INITIAL ENCOUNTER (H): Primary | ICD-10-CM

## 2020-01-14 ENCOUNTER — TELEPHONE (OUTPATIENT)
Dept: NEPHROLOGY | Facility: CLINIC | Age: 30
End: 2020-01-14

## 2020-01-14 NOTE — TELEPHONE ENCOUNTER
Called pt to remind them of their appointment with doctor Anam at 1pm on Wednesday. Also, pt has appointment at 12:30pm. I was unable to leave a voicemail, because it was full. [place]/JUAREZ  January 14, 2020 10:12 AM

## 2021-10-26 ENCOUNTER — HOSPITAL ENCOUNTER (EMERGENCY)
Facility: CLINIC | Age: 31
Discharge: HOME | End: 2021-10-26
Attending: EMERGENCY MEDICINE | Admitting: EMERGENCY MEDICINE

## 2021-10-26 ENCOUNTER — HOSPITAL (OUTPATIENT)
Dept: OTOLARYNGOLOGY | Facility: CLINIC | Age: 31
End: 2021-10-26

## 2021-10-26 VITALS
TEMPERATURE: 98.6 F | BODY MASS INDEX: 21.51 KG/M2 | OXYGEN SATURATION: 99 % | HEART RATE: 102 BPM | WEIGHT: 163 LBS | SYSTOLIC BLOOD PRESSURE: 117 MMHG | DIASTOLIC BLOOD PRESSURE: 86 MMHG | RESPIRATION RATE: 16 BRPM

## 2021-10-26 DIAGNOSIS — R07.0 THROAT PAIN: ICD-10-CM

## 2021-10-26 DIAGNOSIS — J36 PERITONSILLAR ABSCESS: ICD-10-CM

## 2021-10-26 DIAGNOSIS — J36 PERITONSILLAR ABSCESS: Primary | ICD-10-CM

## 2021-10-26 LAB
BASOPHILS # BLD AUTO: 0 10E3/UL (ref 0–0.2)
BASOPHILS NFR BLD AUTO: 0 %
EOSINOPHIL # BLD AUTO: 0.2 10E3/UL (ref 0–0.7)
EOSINOPHIL NFR BLD AUTO: 2 %
ERYTHROCYTE [DISTWIDTH] IN BLOOD BY AUTOMATED COUNT: 12.1 % (ref 10–15)
HCT VFR BLD AUTO: 43.8 % (ref 40–53)
HGB BLD-MCNC: 14.9 G/DL (ref 13.3–17.7)
IMM GRANULOCYTES # BLD: 0.1 10E3/UL
IMM GRANULOCYTES NFR BLD: 1 %
LYMPHOCYTES # BLD AUTO: 1.7 10E3/UL (ref 0.8–5.3)
LYMPHOCYTES NFR BLD AUTO: 15 %
MCH RBC QN AUTO: 28.1 PG (ref 26.5–33)
MCHC RBC AUTO-ENTMCNC: 34 G/DL (ref 31.5–36.5)
MCV RBC AUTO: 83 FL (ref 78–100)
MONOCYTES # BLD AUTO: 1.2 10E3/UL (ref 0–1.3)
MONOCYTES NFR BLD AUTO: 11 %
NEUTROPHILS # BLD AUTO: 8.2 10E3/UL (ref 1.6–8.3)
NEUTROPHILS NFR BLD AUTO: 71 %
NRBC # BLD AUTO: 0 10E3/UL
NRBC BLD AUTO-RTO: 0 /100
PLATELET # BLD AUTO: 387 10E3/UL (ref 150–450)
RBC # BLD AUTO: 5.3 10E6/UL (ref 4.4–5.9)
WBC # BLD AUTO: 11.4 10E3/UL (ref 4–11)

## 2021-10-26 PROCEDURE — 96366 THER/PROPH/DIAG IV INF ADDON: CPT | Performed by: EMERGENCY MEDICINE

## 2021-10-26 PROCEDURE — 250N000009 HC RX 250: Performed by: EMERGENCY MEDICINE

## 2021-10-26 PROCEDURE — 96365 THER/PROPH/DIAG IV INF INIT: CPT | Performed by: EMERGENCY MEDICINE

## 2021-10-26 PROCEDURE — 258N000003 HC RX IP 258 OP 636: Performed by: EMERGENCY MEDICINE

## 2021-10-26 PROCEDURE — 42700 I&D ABSCESS PERITONSILLAR: CPT | Performed by: OTOLARYNGOLOGY

## 2021-10-26 PROCEDURE — 96375 TX/PRO/DX INJ NEW DRUG ADDON: CPT | Performed by: EMERGENCY MEDICINE

## 2021-10-26 PROCEDURE — 42700 I&D ABSCESS PERITONSILLAR: CPT | Performed by: EMERGENCY MEDICINE

## 2021-10-26 PROCEDURE — 36415 COLL VENOUS BLD VENIPUNCTURE: CPT | Performed by: EMERGENCY MEDICINE

## 2021-10-26 PROCEDURE — 99284 EMERGENCY DEPT VISIT MOD MDM: CPT | Mod: 25 | Performed by: EMERGENCY MEDICINE

## 2021-10-26 PROCEDURE — 85025 COMPLETE CBC W/AUTO DIFF WBC: CPT | Performed by: EMERGENCY MEDICINE

## 2021-10-26 PROCEDURE — 96361 HYDRATE IV INFUSION ADD-ON: CPT | Performed by: EMERGENCY MEDICINE

## 2021-10-26 PROCEDURE — 99284 EMERGENCY DEPT VISIT MOD MDM: CPT | Performed by: EMERGENCY MEDICINE

## 2021-10-26 PROCEDURE — 250N000011 HC RX IP 250 OP 636: Performed by: EMERGENCY MEDICINE

## 2021-10-26 RX ORDER — KETOROLAC TROMETHAMINE 30 MG/ML
30 INJECTION, SOLUTION INTRAMUSCULAR; INTRAVENOUS ONCE
Status: COMPLETED | OUTPATIENT
Start: 2021-10-26 | End: 2021-10-26

## 2021-10-26 RX ORDER — BENZONATATE 100 MG/1
100 CAPSULE ORAL 3 TIMES DAILY PRN
Qty: 20 CAPSULE | Refills: 0 | Status: SHIPPED | OUTPATIENT
Start: 2021-10-26 | End: 2021-10-26

## 2021-10-26 RX ORDER — CLINDAMYCIN HCL 300 MG
300 CAPSULE ORAL 3 TIMES DAILY
Qty: 21 CAPSULE | Refills: 0 | Status: SHIPPED | OUTPATIENT
Start: 2021-10-26 | End: 2021-10-26

## 2021-10-26 RX ORDER — LIDOCAINE HYDROCHLORIDE AND EPINEPHRINE 10; 10 MG/ML; UG/ML
10 INJECTION, SOLUTION INFILTRATION; PERINEURAL ONCE
Status: COMPLETED | OUTPATIENT
Start: 2021-10-26 | End: 2021-10-26

## 2021-10-26 RX ORDER — CLINDAMYCIN PHOSPHATE 900 MG/50ML
900 INJECTION, SOLUTION INTRAVENOUS EVERY 8 HOURS
Status: DISCONTINUED | OUTPATIENT
Start: 2021-10-26 | End: 2021-10-26 | Stop reason: HOSPADM

## 2021-10-26 RX ORDER — CLINDAMYCIN HCL 300 MG
300 CAPSULE ORAL 3 TIMES DAILY
Qty: 21 CAPSULE | Refills: 0 | Status: SHIPPED | OUTPATIENT
Start: 2021-10-26

## 2021-10-26 RX ORDER — BENZONATATE 100 MG/1
100 CAPSULE ORAL 3 TIMES DAILY PRN
Qty: 20 CAPSULE | Refills: 0 | Status: SHIPPED | OUTPATIENT
Start: 2021-10-26

## 2021-10-26 RX ORDER — DEXAMETHASONE SODIUM PHOSPHATE 10 MG/ML
10 INJECTION, SOLUTION INTRAMUSCULAR; INTRAVENOUS ONCE
Status: COMPLETED | OUTPATIENT
Start: 2021-10-26 | End: 2021-10-26

## 2021-10-26 RX ADMIN — CLINDAMYCIN PHOSPHATE 900 MG: 900 INJECTION, SOLUTION INTRAVENOUS at 15:12

## 2021-10-26 RX ADMIN — LIDOCAINE HYDROCHLORIDE AND EPINEPHRINE 10 ML: 10; 10 INJECTION, SOLUTION INFILTRATION; PERINEURAL at 18:16

## 2021-10-26 RX ADMIN — DEXAMETHASONE SODIUM PHOSPHATE 10 MG: 10 INJECTION, SOLUTION INTRAMUSCULAR; INTRAVENOUS at 15:02

## 2021-10-26 RX ADMIN — SODIUM CHLORIDE 1000 ML: 9 INJECTION, SOLUTION INTRAVENOUS at 15:02

## 2021-10-26 RX ADMIN — KETOROLAC TROMETHAMINE 30 MG: 30 INJECTION, SOLUTION INTRAMUSCULAR at 15:02

## 2021-10-26 RX ADMIN — BENZOCAINE 0.5 ML: 220 SPRAY, METERED PERIODONTAL at 18:15

## 2021-10-26 ASSESSMENT — ENCOUNTER SYMPTOMS
FEVER: 1
CONFUSION: 0
SORE THROAT: 1
COLOR CHANGE: 0
WEIGHT LOSS: 0
FEVER: 0
VOMITING: 0
HEMOPTYSIS: 0
SORE THROAT: 1
BLURRED VISION: 0
ARTHRALGIAS: 0
VOMITING: 0
HEARTBURN: 0
VOICE CHANGE: 1
DIARRHEA: 0
CHILLS: 0
NAUSEA: 0
DIFFICULTY URINATING: 0
EYE REDNESS: 0
NECK STIFFNESS: 0
SHORTNESS OF BREATH: 0
DOUBLE VISION: 0
SINUS PAIN: 0
NAUSEA: 0
FACIAL SWELLING: 1
TROUBLE SWALLOWING: 0
STRIDOR: 0
ABDOMINAL PAIN: 0
COUGH: 0
SPUTUM PRODUCTION: 0
HEADACHES: 0
PHOTOPHOBIA: 0

## 2021-10-26 NOTE — DISCHARGE INSTRUCTIONS
Instructions from your doctor today:  Emergency Department testing is focused on the potential causes of your symptoms that are the most dangerous possibilities, and cannot cover every possibility. Based on the evaluation, it was deemed sufficiently safe to discharge and continue management through the clinics. Thus, follow-up is very important to assess for improvement/worsening, potential further testing, and potential treatment adjustments. If you were given opioid pain medications or other medications that can make you drowsy while in the ED, you should not drive for at least several hours and not until you feel completely back to normal.     Please make an appointment to follow up with:  - [facility] (phone: [phone]) in 7-10 days  - If you do not have a primary care provider, you can be seen in follow-up and establish care by calling any of the clinics below:     - [facility] Center (phone: [phone])     - Primary Care / [facility] (phone: [phone])   - Have your clinic provider review the results from today's visit with you again, including any potential follow-up or additional testing that may be needed based on the results. Occasionally, incidental findings are found on later review by radiologists that may need follow-up.     Return to the Emergency Department immediately if you have worsening symptoms, or any other urgent or potentially life-threatening concerns.

## 2021-10-26 NOTE — ED NOTES
Emergency Department Patient Sign-out     Brief HPI and ED course:  Patient is a 31 year old male signed out to me by Dr. Grossman.  See initial ED Provider note for details of the presentation. In brief, left peritonsillar abscess. Trismus and subjective fevers. No airway compromise and is tolerating his secretions. Got ketorolac, dexamethasone, clindamycin. ENT     Vitals:   Patient Vitals for the past 24 hrs:   BP Pulse Resp SpO2   10/26/21 1837 -- -- 16 99 %   10/26/21 1836 117/86 102 -- --       Received Sign-out Plan:    Pending studies include: ENT evaluation       Plan:   - f/u ENT evaluation and likely I&D/aspiration of PTA, likely discharge    Events after assuming care:  ENT provider arrived and performed aspiration of PTA, recommended discharge with clindamycin and follow-up with ENT clinic in about 10 days.    The complete clinical picture is most consistent with peritonsillar abscess. After counseling on the diagnosis, work-up, and treatment plan, the patient was discharged to home. The patient was advised to follow-up with ENT in 7 to 10 days. The patient was advised to return to the ED if worsening symptoms, or if there are any urgent/life-threatening concerns.     Final diagnoses:   Peritonsillar abscess   Throat pain     Discharge Medication List as of 10/26/2021  6:20 PM        START taking these medications    Details   benzonatate (TESSALON) 100 MG capsule Take 1 capsule (100 mg) by mouth 3 times daily as needed (cough or throat pain), Disp-20 capsule, R-0, E-Prescribe      clindamycin (CLEOCIN) 300 MG capsule Take 1 capsule (300 mg) by mouth 3 times daily for 7 days, Disp-21 capsule, R-0, E-Prescribe             --  Tom Reyes MD   Emergency Medicine   [facility] EMERGENCY DEPARTMENT  10/26/2021       Tom Reyes MD  10/27/21 7427

## 2021-10-26 NOTE — PROGRESS NOTES
HPI    This is a 31 year old male who presents with sore throat and swollen tonsils. The sore throat started 1 week ago along with fevers, chills and sweats. However today he developed tonsillar swelling and voice change. The swelling seems to be deep in his throat, under his tongue and feels as if it's compressing things in his throat. He is able to chew and swallow a bit, but it is difficult due to sublingual swelling and new limited range of motion to his jaw. He has ear pain and dental pain with this. The ear pain feels like an ear infection. He has had distant history of strep but has never had swollen tonsils like this before. No vomiting or diarrhea. No history of diabetes. No known drug allergies.  He has noted that his voice has begun to change over the past several days and that it is difficult to open his mouth over the past 2 days.    Review of Systems   Constitutional: Positive for fever and malaise/fatigue. Negative for chills and weight loss.   HENT: Positive for sore throat. Negative for congestion, ear discharge, ear pain, hearing loss, nosebleeds, sinus pain and tinnitus.    Eyes: Negative for blurred vision, double vision and photophobia.   Respiratory: Negative for cough, hemoptysis, sputum production and stridor.    Gastrointestinal: Negative for heartburn, nausea and vomiting.   Skin: Negative.          Physical Exam  Vitals reviewed.   HENT:      Head: Normocephalic and atraumatic.      Jaw: There is normal jaw occlusion.      Right Ear: Tympanic membrane, ear canal and external ear normal.      Left Ear: Tympanic membrane, ear canal and external ear normal.      Nose: Nose normal.      Mouth/Throat:      Mouth: Mucous membranes are moist.      Tongue: No lesions.      Pharynx: Pharyngeal swelling, oropharyngeal exudate and posterior oropharyngeal erythema present.      Tonsils: Tonsillar exudate and tonsillar abscess present.   Eyes:      Extraocular Movements: Extraocular movements intact.       Pupils: Pupils are equal, round, and reactive to light.   Neurological:      Mental Status: He is alert.     LEft tonsil is swollen and cryptic. Left paramedian fluctuating soft palate was palpated and numbed with topical xylocaine oral spray and an 18 gauge needle was inserted from two different locations and 0.4 ml purulent secretion was suctioned. Sample was sent to microbiology for a culture.    A/P  Sawyer will continue with Clindamycin 300 mg tid along with Ibuprofen as needed. He will be encouraged to have good hydration and f/u in 10 days.    A/P

## 2021-10-26 NOTE — ED PROVIDER NOTES
[facility] EMERGENCY DEPARTMENT ([facility])    10/26/21     ED 4 2:04 PM   History     Chief Complaint   Patient presents with     Oral Swelling     reports has had pain and swelling to jaw, throat, ear for the the past week; subjective fevers     The history is provided by the patient and medical records.     Sawyer Walters is a 31 year old male who presents with sore throat and swollen tonsils. The sore throat started 1 week ago along with fevers, chills and sweats. However today he developed tonsillar swelling and voice change. The swelling seems to be deep in his throat, under his tongue and feels as if it's compressing things in his throat. He is able to chew and swallow a bit, but it is difficult due to sublingual swelling and new limited range of motion to his jaw. He has ear pain and dental pain with this. The ear pain feels like an ear infection. He has had distant history of strep but has never had swollen tonsils like this before. No vomiting or diarrhea. No history of diabetes. No known drug allergies.  He has noted that his voice has begun to change over the past several days and that it is difficult to open his mouth over the past 2 days.  Past Medical History  History reviewed. No pertinent past medical history.  Past Surgical History:   Procedure Laterality Date     IR CVC TUNNEL PLACEMENT > 5 YRS OF AGE  11/12/2019     IR CVC TUNNEL REMOVAL RIGHT  12/27/2019     amLODIPine (NORVASC) 10 MG tablet  camphor-menthol-methyl sal 4-10-30 % CREA  oxyCODONE (ROXICODONE) 5 MG tablet  tiZANidine (ZANAFLEX) 4 MG tablet      No Known Allergies  Family History  History reviewed. No pertinent family history.  Social History   Social History     Tobacco Use     Smoking status: Current Every Day Smoker     Packs/day: 0.50     Smokeless tobacco: Never Used   Substance Use Topics     Alcohol use: Not Currently     Drug use: Not Currently      Past medical history, past surgical history, medications,  allergies, family history, and social history were reviewed with the patient. No additional pertinent items.       Review of Systems   Constitutional: Negative for fever.   HENT: Positive for dental problem, ear pain, facial swelling (swelling in back of throat, under jaw and tongue), sore throat and voice change. Negative for congestion and trouble swallowing.    Eyes: Negative for redness.   Respiratory: Negative for shortness of breath.    Cardiovascular: Negative for chest pain.   Gastrointestinal: Negative for abdominal pain, diarrhea, nausea and vomiting.   Genitourinary: Negative for difficulty urinating.   Musculoskeletal: Negative for arthralgias and neck stiffness.   Skin: Negative for color change.   Neurological: Negative for headaches.   Psychiatric/Behavioral: Negative for confusion.   All other systems reviewed and are negative.    A complete review of systems was performed with pertinent positives and negatives noted in the HPI, and all other systems negative.    Physical Exam   BP: 123/82  Pulse: 111  Temp: 98.6  F (37  C)  Resp: 16  Weight: 73.9 kg (163 lb)  SpO2: 98 %  Physical Exam  Constitutional:       General: He is not in acute distress.     Appearance: He is not diaphoretic.   HENT:      Head: Atraumatic.      Mouth/Throat:      Dentition: Normal dentition. Does not have dentures. No dental tenderness, gingival swelling, dental caries, dental abscesses or gum lesions.      Tongue: No lesions. Tongue does not deviate from midline.      Palate: No mass and lesions.      Pharynx: Oropharyngeal exudate present. No pharyngeal swelling, posterior oropharyngeal erythema or uvula swelling.      Tonsils: Tonsillar abscess present. No tonsillar exudate. 4+ on the left.   Eyes:      General: No scleral icterus.     Pupils: Pupils are equal, round, and reactive to light.   Cardiovascular:      Heart sounds: Normal heart sounds.   Pulmonary:      Effort: No respiratory distress.      Breath sounds:  Normal breath sounds.   Abdominal:      General: Bowel sounds are normal.      Palpations: Abdomen is soft.      Tenderness: There is no abdominal tenderness.   Musculoskeletal:         General: No tenderness.   Skin:     General: Skin is warm.      Findings: No rash.         ED Course      Procedures       The medical record was reviewed and interpreted.  Current labs reviewed and interpreted.  Previous labs reviewed and interpreted.       Results for orders placed or performed during the hospital encounter of 10/26/21   CBC with platelets and differential     Status: Abnormal   Result Value Ref Range    WBC Count 11.4 (H) 4.0 - 11.0 10e3/uL    RBC Count 5.30 4.40 - 5.90 10e6/uL    Hemoglobin 14.9 13.3 - 17.7 g/dL    Hematocrit 43.8 40.0 - 53.0 %    MCV 83 78 - 100 fL    MCH 28.1 26.5 - 33.0 pg    MCHC 34.0 31.5 - 36.5 g/dL    RDW 12.1 10.0 - 15.0 %    Platelet Count 387 150 - 450 10e3/uL    % Neutrophils 71 %    % Lymphocytes 15 %    % Monocytes 11 %    % Eosinophils 2 %    % Basophils 0 %    % Immature Granulocytes 1 %    NRBCs per 100 WBC 0 <1 /100    Absolute Neutrophils 8.2 1.6 - 8.3 10e3/uL    Absolute Lymphocytes 1.7 0.8 - 5.3 10e3/uL    Absolute Monocytes 1.2 0.0 - 1.3 10e3/uL    Absolute Eosinophils 0.2 0.0 - 0.7 10e3/uL    Absolute Basophils 0.0 0.0 - 0.2 10e3/uL    Absolute Immature Granulocytes 0.1 (H) <=0.0 10e3/uL    Absolute NRBCs 0.0 10e3/uL   CBC with platelets differential     Status: Abnormal    Narrative    The following orders were created for panel order CBC with platelets differential.  Procedure                               Abnormality         Status                     ---------                               -----------         ------                     CBC with platelets and d...[[id number]]  Abnormal            Final result                 Please view results for these tests on the individual orders.     Medications   0.9% sodium chloride BOLUS (1,000 mLs Intravenous New Bag 10/26/21  1502)   clindamycin (CLEOCIN) infusion 900 mg (900 mg Intravenous New Bag 10/26/21 1512)   lidocaine 1% with EPINEPHrine 1:100,000 injection 10 mL (has no administration in time range)   benzocaine 20% (HURRICAINE/TOPEX) 20 % spray 0.5 mL (has no administration in time range)   ketorolac (TORADOL) injection 30 mg (30 mg Intravenous Given 10/26/21 1502)   dexamethasone PF (DECADRON) injection 10 mg (10 mg Intravenous Given 10/26/21 1502)        Assessments & Plan (with Medical Decision Making)   31-year-old male who presents for evaluation of progressive sore throat, swelling and difficulty swallowing.  Differential includes streptococcal pharyngitis, peritonsillar abscess, retropharyngeal abscess, Jamaal's angina, stomatitis, parotitis, dental abscess.  Exam reveals evidence of peritonsillar abscess, with trismus and slightly muffled voice.  Laboratories were obtained revealing slightly elevated white blood count at 11.7.  In the emergency room, patient received Toradol, Decadron and clindamycin.  The case was discussed at length with otolaryngology who will be in to see the patient, please see separate note.  Patient will be signed out to incoming provider for discharge planning.    I have reviewed the nursing notes. I have reviewed the findings, diagnosis, plan and need for follow up with the patient.    New Prescriptions    No medications on file       Final diagnoses:   Peritonsillar abscess     MAVIS, Carolina Serrano, am serving as a trained medical scribe to document services personally performed by Aydin Grossman MD based on the provider's statements to me on October 26, 2021.  This document has been checked and approved by the attending provider.    IAydin MD, was physically present and have reviewed and verified the accuracy of this note documented by Carolina Serrano, medical scribe.      Aydin Grossman MD       [facility] EMERGENCY DEPARTMENT  10/26/2021     Aydin Grossman MD  10/28/21  [id number]

## 2023-02-16 NOTE — PROGRESS NOTES
----- Message from Katie Pineda MA sent at 2/16/2023  8:34 AM CST -----  Regarding: Callback  Mother, Mlelisa, is stating that pt was supposed to be scheduled an EKG. I don't see anything in the chart about this. Please advise.         [phone]     Pt has still not returned to unit.

## 2023-07-30 ENCOUNTER — HEALTH MAINTENANCE LETTER (OUTPATIENT)
Age: 33
End: 2023-07-30

## 2024-04-26 ENCOUNTER — HOSPITAL ENCOUNTER (EMERGENCY)
Facility: CLINIC | Age: 34
Discharge: HOME | End: 2024-04-26
Attending: EMERGENCY MEDICINE | Admitting: EMERGENCY MEDICINE
Payer: COMMERCIAL

## 2024-04-26 VITALS
SYSTOLIC BLOOD PRESSURE: 94 MMHG | WEIGHT: 185.5 LBS | TEMPERATURE: 98.1 F | DIASTOLIC BLOOD PRESSURE: 68 MMHG | HEART RATE: 89 BPM | RESPIRATION RATE: 16 BRPM | BODY MASS INDEX: 29.11 KG/M2 | OXYGEN SATURATION: 95 % | HEIGHT: 67 IN

## 2024-04-26 DIAGNOSIS — F11.90 OPIOID USE DISORDER: ICD-10-CM

## 2024-04-26 LAB — ALCOHOL BREATH TEST: 0 (ref 0–0.01)

## 2024-04-26 PROCEDURE — 82075 ASSAY OF BREATH ETHANOL: CPT | Performed by: EMERGENCY MEDICINE

## 2024-04-26 PROCEDURE — G2213 INITIAT MED ASSIST TX IN ER: HCPCS | Performed by: EMERGENCY MEDICINE

## 2024-04-26 PROCEDURE — 99284 EMERGENCY DEPT VISIT MOD MDM: CPT | Performed by: EMERGENCY MEDICINE

## 2024-04-26 RX ORDER — BUPRENORPHINE AND NALOXONE 8; 2 MG/1; MG/1
1 FILM, SOLUBLE BUCCAL; SUBLINGUAL DAILY
Qty: 9 FILM | Refills: 0 | Status: SHIPPED | OUTPATIENT
Start: 2024-04-26

## 2024-04-26 ASSESSMENT — LIFESTYLE VARIABLES: TOTAL_SCORE: 1

## 2024-04-26 ASSESSMENT — ACTIVITIES OF DAILY LIVING (ADL)
ADLS_ACUITY_SCORE: 37

## 2024-04-26 ASSESSMENT — COLUMBIA-SUICIDE SEVERITY RATING SCALE - C-SSRS
6. HAVE YOU EVER DONE ANYTHING, STARTED TO DO ANYTHING, OR PREPARED TO DO ANYTHING TO END YOUR LIFE?: NO
1. IN THE PAST MONTH, HAVE YOU WISHED YOU WERE DEAD OR WISHED YOU COULD GO TO SLEEP AND NOT WAKE UP?: NO
2. HAVE YOU ACTUALLY HAD ANY THOUGHTS OF KILLING YOURSELF IN THE PAST MONTH?: NO

## 2024-04-26 NOTE — ED TRIAGE NOTES
Pt seeking detox from methamphetamines and Fentanyl.  Last use last night at around 5 pm.        Triage Assessment (Adult)       Row Name 04/26/24 0323          Triage Assessment    Airway WDL WDL        Respiratory WDL    Respiratory WDL WDL        Skin Circulation/Temperature WDL    Skin Circulation/Temperature WDL WDL        Cardiac WDL    Cardiac WDL WDL        Peripheral/Neurovascular WDL    Peripheral Neurovascular WDL WDL        Cognitive/Neuro/Behavioral WDL    Cognitive/Neuro/Behavioral WDL WDL

## 2024-04-26 NOTE — ED PROVIDER NOTES
ED Provider Note  [facility]      History     Chief Complaint   Patient presents with    Addiction Problem     Seeking detox from meth and Fent     HPI  Umesh Walters is a 33 year old male who has a past medical history of opioid use disorder, polysubstance abuse who presents emergency department seeking detox.  Patient reports daily fentanyl and methamphetamine use.  Patient reports last use was last night around 1700.  Patient denies any other substances.  No acute medical complaints.  Patient denies any suicidal ideation, homicide nation, or intent to self-harm.     Past Medical History  History reviewed. No pertinent past medical history.  Past Surgical History:   Procedure Laterality Date    IR CVC TUNNEL PLACEMENT > 5 YRS OF AGE  11/12/2019    IR CVC TUNNEL REMOVAL RIGHT  12/27/2019     buprenorphine HCl-naloxone HCl (SUBOXONE) 8-2 MG per film  naloxone (NARCAN) 4 MG/0.1ML nasal spray  naloxone (NARCAN) 4 MG/0.1ML nasal spray  amLODIPine (NORVASC) 10 MG tablet  benzonatate (TESSALON) 100 MG capsule  camphor-menthol-methyl sal 4-10-30 % CREA  clindamycin (CLEOCIN) 300 MG capsule  oxyCODONE (ROXICODONE) 5 MG tablet  tiZANidine (ZANAFLEX) 4 MG tablet      No Known Allergies  Family History  History reviewed. No pertinent family history.  Social History   Social History     Tobacco Use    Smoking status: Every Day     Current packs/day: 0.50     Types: Cigarettes, Vaping Device    Smokeless tobacco: Never   Substance Use Topics    Alcohol use: Not Currently    Drug use: Yes     Types: Methamphetamines, Fentanyl     Comment: daily use, last used on 4/25 at 5 pm      Past medical history, past surgical history, medications, allergies, family history, and social history were reviewed with the patient. No additional pertinent items.      A medically appropriate review of systems was performed with pertinent positives and negatives noted in the HPI, and all other systems  "negative.    Physical Exam   BP: 107/71  Pulse: 86  Temp: 97.6  F (36.4  C)  Resp: 18  Height: 170.2 cm (5' 7\")  Weight: 84.1 kg (185 lb 8 oz)  SpO2: 99 %  Physical Exam  General: Afebrile, no acute distress   HEENT: Normocephalic, atraumatic, conjunctivae normal. MMM  Neck: non-tender, supple  Cardio: regular rate. regular rhythm   Resp: Normal work of breathing, no respiratory distress, lungs clear bilaterally, no wheezing, rhonchi, rales  Chest/Back: no visual signs of trauma, no CVA tenderness   Abdomen: soft, non distension, no tenderness, no peritoneal signs   Neuro: alert and fully oriented. CN II-XII grossly intact. Grossly normal strength and sensation in all extremities.   MSK: no deformities. Normal range of motion  Integumentary/Skin: no rash visualized, normal color  Psych: normal affect, normal behavior, denies suicide ideation, homicidal ideation, or intent to self harm       ED Course, Procedures, & Data      Procedures       -----  Initiation of Medication for the Treatment of Opioid Use Disorder (OUD) in the Emergency Department (ED)  HCPCS code      Assessment:   Opioid(s) used: fentanyl   Frequency: Daily  Route: smoked  Last use: Last night 1700    Other substance(s) with ongoing use: Methamphetamine     The patient meets the following DSM-V criteria for Opioid Use Disorder (OUD):   Taking more than was intended  Persistent desire or unsuccessful efforts to cut down  Time spent in activities necessary to obtain, use, and recover  Craving  Failure to fulfill obligations at work, school, or home  Continued use despite causing social or interpersonal problems     (Severity: Mild: 2-3 criteria, Moderate: 4-5 criteria. Severe: 6 or more criteria)  Based on my assessment, the patient has Severe OUD.     Medication Initiated in the ED:  In the ED, treatment for OUD was initiated. The patient was given no dose(s) of  buprenorphine while in the ED.     Upon ED discharge, outpatient prescription " treatment for OUD was initiated.   The patient was prescribed Suboxone and naloxone.      Referral to Ongoing Care and Supportive Services:   Upon ED discharge, the patient was referred to Addiction Medicine for ongoing care and supportive services. The patient was also provided with information on community resources for various supportive services for OUD, and advised to return to the ED if having worsening symptoms.   -----        Results for orders placed or performed during the hospital encounter of 04/26/24   Alcohol breath test POCT     Status: Normal   Result Value Ref Range    Alcohol Breath Test 0.00 0.00 - 0.01     Medications - No data to display  Labs Ordered and Resulted from Time of ED Arrival to Time of ED Departure   ALCOHOL BREATH TEST POCT - Normal       Result Value    Alcohol Breath Test 0.00       No orders to display          Critical care was not performed.     Medical Decision Making  The patient's presentation was of moderate complexity (a chronic illness mild to moderate exacerbation, progression, or side effect of treatment).    The patient's evaluation involved:  discussion of management or test interpretation with another health professional (SUN)    The patient's management necessitated moderate risk (prescription drug management including medications given in the ED) and high risk (a parenteral controlled substance).    Assessment & Plan    Umesh Walters is a 33 year old male who has a past medical history of opioid use disorder, polysubstance abuse who presents emergency department seeking detox.     I have reviewed the nursing notes. I have reviewed the findings, diagnosis, plan and need for follow up with the patient.    Discharge Medication List as of 4/26/2024  8:29 AM        START taking these medications    Details   buprenorphine HCl-naloxone HCl (SUBOXONE) 8-2 MG per film Place 1 Film under the tongue daily, Disp-9 Film, R-0, [facility]      !! naloxone (NARCAN) 4 MG/0.1ML  nasal spray Spray 1 spray (4 mg) into one nostril alternating nostrils as needed for opioid reversal every 2-3 minutes until assistance arrives, Disp-0.2 mL, R-0, [employer]      !! naloxone (NARCAN) 4 MG/0.1ML nasal spray Spray 1 spray (4 mg) into one nostril alternating nostrils as needed for opioid reversal every 2-3 minutes until assistance arrives, Disp-2 each, R-0, [facility]       !! - Potential duplicate medications found. Please discuss with provider.          Final diagnoses:   Opioid use disorder       Estelle Dougherty MD  [facility] EMERGENCY DEPARTMENT  4/26/2024     Estelle Dougherty MD  04/27/24 0228

## 2024-04-26 NOTE — DISCHARGE INSTRUCTIONS
.Buprenorphine Home Induction Instructions      Before taking your first dose, you will need to stop using all opioids for 12-36 hours and you will need to feel very sick with opioid withdrawal symptoms.  If you take buprenorphine too soon you will feel worse and will go into full opioid withdrawal!     For short-acting opioids like Pure Heroin, Percocet, Vicodin, Morphine it usually takes 12-24 hours before first dose.    For long-acting opioids like Oxycontin, MS Contin, Fentanyl and drugs laced with Fentanyl (Perc30) it can take 36 hours before first dose.  Methadone can take greater than 48 hours.     Before you take your first dose you should feel very sick and have at least 3 of the following symptoms:     bad chills or sweating  heavy yawning  joint/bone aches  enlarged pupils  runny nose or watery eyes  feeling restless  goose bumps  anxious or irritable  cramps, nausea, vomiting or diarrhea  twitching/tremors/shakes        Home Induction Day 1:    If you feel you are ready for your first buprenorphine dose (based on above guidelines) take 4 mg buprenorphine.  Put the tablet or film under your tongue.  Do not swallow it.  It does not work if swallowed.  Do not eat, drink or smoke anything while it is dissolving.  Wait 2 minutes after it has dissolved before you swallow or put anything in your mouth.     Wait 1 hour.      If you feel worse, do not take anymore buprenorphine for 12 hours.  You likely took your first dose too soon and it is making you have withdrawal symptoms.     If you are feeling fine, do not take any more medication today.      If you are feeling better but still having some withdrawal symptoms, take a 2nd dose of 4 mg under your tongue.    Wait 2-4 hours.      If you are feeling fine, do not take any more medication today.      If you are still having withdrawal symptoms, take a 3rd dose of 4 mg under your tongue.      Wait 2-4 hours.      If you are feeling fine, do not take any more  medication today.      If you are still having withdrawal symptoms, take a 4th dose of 4 mg under your tongue.     STOP.  Maximum dose is 16 mg of buprenorphine total.  Do not take more.  Most people feel better after 4-16 mg total on Day 1.         Home Induction Day 2:     Your dose today will be based on your response to the total amount of buprenorphine taken on Day 1.  Day 2 dose = total mg taken on Day 1.     Take today's dose in the morning.  If Day 2 dose is more than 8 mg, you can split your dose between morning and evening.    If you felt sleepy on Day 1, you should take 4 mg less today than you did on Day 1.     Later today if you feel withdrawal symptoms, you may want to take another 4 mg.    Maximum dose Day 2 is 16 mg.         Home Induction Day 3:    Today's dose will be based on your response to the total amount of buprenorphine taken on Day 2.  Day 3 dose = total mg taken on Day 2.    If Day 3 dose is more than 8 mg, you can split your dose between morning and evening.    Maximum dose Day 3 is 16 mg.         Follow up with your addiction specialist as scheduled for further dosing recommendations and medication refills.     [address], [address]  [place] [address]  Phone: [phone]  Fax:  [phone]       Hours:       Monday, Wednesday, Friday     Scheduled visits: 9:00 am - 4:00 pm     Walk-in hours:  9:00 am - 3:00 pm        Tuesday, Thursday     Walk-in only hours: 1:30 pm - 4:00 pm   Naloxone (Narcan)  Frequently Asked Questions  What is naloxone (Narcan)?  Naloxone (Narcan) is a prescription medicine that can reverse an overdose from opioid medicines, such as:  codeine, morphine  hydrocodone, oxycodone, hydromorphone, oxymorphone  fentanyl, meperidine, methadone  buprenorphine  Opioids can cause bad reactions. If you take more opioids than your body can handle (overdose), your breathing can slow too much or even stop. Naloxone blocks the  "effects of opioids on the brain, which can quickly reverse an overdose.   Naloxone cannot be used to get high and is not addictive. It is safe and effective. Emergency medical professionals have used it to save lives for decades.  Naloxone does not prevent deaths caused by other drugs, such as bath salts, cocaine, methamphetamine, alcohol and benzodiazepines: alprazolam, clonazepam, diazepam, lorazepam.  Who can carry or administer naloxone?  In [place], anyone at risk for having a drug overdose or witnessing one can get a prescription for naloxone. Users, family members and concerned friends can all carry naloxone in the same way people with allergies are allowed to carry an epinephrine syringe (\"epi-pen\").   For safety, store naloxone in a locked cabinet or other space that is out of the reach of children and pets.  When should naloxone be given?  If you suspect an opioid overdose, look for these symptoms:  Breathing slows or stops; or, person has pinpoint pupils and won't wake up  No response, even if you shake them or say  their name  Lips and fingernails turn blue, purple or gray  Skin gets painful or clammy  Slowed heartbeat and/or low blood pressure  Even if you have reversed an overdose with naloxone, always call 911. Naloxone usually wears off in 30 to 90 minutes. The person can stop breathing again unless more naloxone is available. An overdose victim will also need other care. For these reasons, it is safest to call 911 and get medical care right away.  How long does naloxone take to work?  Naloxone begins to work in 2 to 5 minutes.  If the person doesn't wake up in 3 minutes, bystanders should give a second dose.  Rescue breathing should be done while you wait for the naloxone to work. This will make sure the person gets enough oxygen to the brain.  How do you give naloxone?  Bystanders can safely and legally spray naloxone into the nose (nasal spray) or inject it into the thigh.   Nasal spray (with " "assembly): Place the foam tip (atomizer) onto a syringe and put into the nostril. Spray one half of the capsule (1 mg) into each nostril.  Nasal spray (no assembly needed): Spray up one nostril by pushing the plunger.  Injection into the muscle (with assembly or by auto-injector): Inject into the outer thigh. In an emergency, it is safe to inject through clothing. The auto-injector contains a speaker that provides step-by-step instructions.  Can naloxone harm someone?  No. It is safe to give naloxone any time you suspect an overdose. (It will not hurt if you are wrong about it being an overdose.) People who receive naloxone for an overdose may wake up and go into withdrawal. Withdrawal can be miserable, but it is better than dying.   Symptoms of withdrawal include:  Feeling nervous, restless or irritable  Body aches  Dizziness or weakness  Diarrhea, stomach pain or feeling a little sick  Fever, chills or goose bumps  Sneezing or runny nose  Talk with your doctor about how to deal with these and other feelings of withdrawal.   Is naloxone just a \"safety net\" that allows users to use even more?  Research has found that having naloxone available does not encourage people to use opioids more. The goal of distributing naloxone and educating people about how to prevent, recognize and intervene in overdoses is to prevent deaths. Other goals, such as decreasing drug use, can only be met if the user is alive.  What are the side effects of naloxone?  Call 911 right away if you have:  An allergic reaction, such as large bumps on your skin (hives), trouble breathing, swelling of the face, lips, tongue or throat. (Don't drive or perform unsafe tasks until you know how you will react to naloxone.)  Chest pain or fast or irregular heart beat  Increase in blood pressure  Muscle pain or cramping  Nasal dryness, congestion or inflammation  Shortness of breath  Sweating, feeling very sick to your stomach or throwing up  Bad headache, " agitation, anxiety, confusion or ringing in your ears  Seizures (convulsions)  Dizziness, fainting or feeling like you might pass out  For informational purposes only. Not to replace the advice of your health care provider. Copyright   2015 [employer]. All rights reserved. Clinically reviewed by Mgr [employer] Prgr[employer]. [employer] [id number] - REV 04/21.      Instructions:     To manage symptoms of withdrawal, please take your medicines as directed.     If symptoms are severe and the medicines don t help, return to the emergency room.  You will be evaluated and treated for withdrawal symptoms which may include additional buprenorphine (Suboxone, Subutex).

## 2024-04-29 ENCOUNTER — TELEPHONE (OUTPATIENT)
Dept: BEHAVIORAL HEALTH | Facility: CLINIC | Age: 34
End: 2024-04-29
Payer: COMMERCIAL

## 2024-04-29 NOTE — TELEPHONE ENCOUNTER
Patient is scheduled for fasting labs on 6/19, please place orders.    RN reviewed Adult Mental Health and  Referral. Patient with a history of opioid use disorder, daily fentanyl use. [facility].     RN called patient at number on file, no answer. Left vague voicemail regarding referral that was entered on 4/26/2024 and to call back at [phone].    Will send [employer] message as well.    [facility]  [address], [address]   [place], [place], [address]  Phone: [phone]  Fax: [phone]    Open Monday-Friday  Closed over lunch hour  Walk in hours: 9am-11:30am and 12:30-3pm    *For [facility] providers, if you'd like to have [facility] staff reach out to a patient, enter ambulatory order Adult Mental Health  Referral #[phone] for Addiction Medicine or [facility] Referral #[phone] for Addiction Medicine, as appropriate.

## 2024-09-16 ENCOUNTER — TELEPHONE (OUTPATIENT)
Dept: ADDICTION MEDICINE | Facility: CLINIC | Age: 34
End: 2024-09-16
Payer: COMMERCIAL

## 2024-09-16 NOTE — TELEPHONE ENCOUNTER
"Pt is a(n) adult (18+ out of HS) Seeking as eval for Adult [facility] Assessment for [facility]..  Appointment scheduled by:  Patient.  (self-pay - complete Cost Estimate)  Caller name:  Sawyer    Caller phone #: [phone] (Patient is in process of getting new phone #, will call when there is new info)  Legal Guardianship Reviewed?  No  Honoring Choices Notified?  No  Brief reason for appt:  Looking to get into lodging + for treatment     needed?  NO    Contact information verified/updated: Yes    If appt is for adult JOELLEN program location, confirm you have verified the location and address with the patient referring to the template header.  Yes    Susana Luis    \"We have scheduled your evaluation. In the event that your insurance coverage comes back as out of network, you may receive a call to cancel your appointment and direct you to your insurance company for in-network coverage.\"    Disclaimer regarding insurance read to patient?  Yes  Informed patient Remy are for programming that is in person in the [place] area?  Yes - proceed with scheduling   "

## 2024-09-22 ENCOUNTER — HEALTH MAINTENANCE LETTER (OUTPATIENT)
Age: 34
End: 2024-09-22

## 2024-10-23 ENCOUNTER — HOSPITAL ENCOUNTER (EMERGENCY)
Facility: HOSPITAL | Age: 34
Discharge: HOME | End: 2024-10-23
Attending: EMERGENCY MEDICINE | Admitting: EMERGENCY MEDICINE
Payer: COMMERCIAL

## 2024-10-23 VITALS
HEART RATE: 92 BPM | DIASTOLIC BLOOD PRESSURE: 71 MMHG | OXYGEN SATURATION: 95 % | BODY MASS INDEX: 28.98 KG/M2 | WEIGHT: 185 LBS | TEMPERATURE: 98.3 F | SYSTOLIC BLOOD PRESSURE: 113 MMHG

## 2024-10-23 DIAGNOSIS — F11.129: ICD-10-CM

## 2024-10-23 PROCEDURE — 99283 EMERGENCY DEPT VISIT LOW MDM: CPT

## 2024-10-23 ASSESSMENT — ACTIVITIES OF DAILY LIVING (ADL): ADLS_ACUITY_SCORE: 0

## 2024-10-23 ASSESSMENT — COLUMBIA-SUICIDE SEVERITY RATING SCALE - C-SSRS
2. HAVE YOU ACTUALLY HAD ANY THOUGHTS OF KILLING YOURSELF IN THE PAST MONTH?: NO
1. IN THE PAST MONTH, HAVE YOU WISHED YOU WERE DEAD OR WISHED YOU COULD GO TO SLEEP AND NOT WAKE UP?: NO
6. HAVE YOU EVER DONE ANYTHING, STARTED TO DO ANYTHING, OR PREPARED TO DO ANYTHING TO END YOUR LIFE?: NO

## 2024-10-23 NOTE — ED NOTES
Bed: ED-H  Expected date:   Expected time:   Means of arrival:   Comments:  [facility]34 male fentanyl ingestion, VSS

## 2024-10-23 NOTE — ED TRIAGE NOTES
The pt presents via [facility] for evaluation of possible fentanyl ingestion. He was caught shoplifting at [employer]. When PD arrived, the pt vomited, and stated he ingested 1 gram of PO fentanyl. Pt is sleepy appearing, and complaining of a stomach ache. VSS

## 2024-10-23 NOTE — ED PROVIDER NOTES
"EMERGENCY DEPARTMENT ENCOUNTER            IMPRESSION:  Opiate intoxication        MEDICAL DECISION MAKING:  It was my pleasure to provide care for Umesh Walters who presented for evaluation of opiate intoxication.  Patient was brought in by PD after intentionally ingesting oral fentanyl in an attempt to avoid incarceration    On my exam patient sleepy but responds to voice and is cooperative and will follow commands vital signs are normal.  Physical exam notable for patient appears sedated.  Pupils small    Patient was placed on cardiac monitor, he appeared to be improving    He left AMA without notifying the staff        =================================================================  CHIEF COMPLAINT:  Chief Complaint   Patient presents with    Ingestion         HPI  Umesh Walters is a 34 year old male with a pertinent history of narcotic overdose, opioid use disorder, who presents to the ED by walk-in for evaluation of ingestion.    The patient arrives from the store via ambulance for drug ingestion. Law enforcement was apprehending the patient for theft. He had a bag of fentanyl with him and he ingested it in order to hide it from law enforcement. He reports there was \"a little less than a gram\" of fentanyl in the bag. He reports smoking a little bit of fentanyl daily. Denies injecting fentanyl. He did not receive any Narcan enroute. No other recreational drugs used. No other associated symptoms at this time.        REVIEW OF SYSTEMS  Constitutional: Does not report chills, unintentional weight loss or fatigue   Eyes: Does not report visual changes or discharge    HENT: Does not report sore throat, ear pain or neck pain  Respiratory: Does not report cough or shortness of breath    Cardiovascular: Does not report chest pain, palpitations or leg swelling  GI: Does not report abdominal pain, nausea, vomiting, or dark, bloody stools.    : Does not report hematuria, dysuria, or flank pain  Musculoskeletal: " Does not report any new musculoskeletal pain or new muscle/joint pains  Skin: Does not report rash or wound  Neurologic: Does not report current headache, new weakness, focal weakness, or sensory changes        Remainder of systems reviewed, unless noted in HPI all others negative.      PAST MEDICAL HISTORY:  No past medical history on file.    PAST SURGICAL HISTORY:  Past Surgical History:   Procedure Laterality Date    IR CVC TUNNEL PLACEMENT > 5 YRS OF AGE  11/12/2019    IR CVC TUNNEL REMOVAL RIGHT  12/27/2019         CURRENT MEDICATIONS:    amLODIPine (NORVASC) 10 MG tablet  benzonatate (TESSALON) 100 MG capsule  buprenorphine HCl-naloxone HCl (SUBOXONE) 8-2 MG per film  camphor-menthol-methyl sal 4-10-30 % CREA  clindamycin (CLEOCIN) 300 MG capsule  naloxone (NARCAN) 4 MG/0.1ML nasal spray  naloxone (NARCAN) 4 MG/0.1ML nasal spray  oxyCODONE (ROXICODONE) 5 MG tablet  tiZANidine (ZANAFLEX) 4 MG tablet        ALLERGIES:  No Known Allergies    FAMILY HISTORY:  No family history on file.    SOCIAL HISTORY:   Social History     Socioeconomic History    Marital status: Single   Tobacco Use    Smoking status: Every Day     Current packs/day: 0.50     Types: Cigarettes, Vaping Device    Smokeless tobacco: Never   Substance and Sexual Activity    Alcohol use: Not Currently    Drug use: Yes     Types: Methamphetamines, Fentanyl     Comment: daily use, last used on 4/25 at 5 pm       PHYSICAL EXAM:    /71   Pulse 92   Temp 98.3  F (36.8  C) (Oral)   Wt 83.9 kg (185 lb)   SpO2 95%   BMI 28.98 kg/m          Constitutional: Awake, alert, sedated  Head: Normocephalic, atraumatic.  ENT: Mucous membranes are moist.  No pallor.   Eyes: Pupils constricted  Neck: No lymphadenopathy, no stridor, supple, no soft tissue swelling  Chest: No tenderness   Respiratory: Respirations even, unlabored. Lungs clear to ascultation bilaterally, in no acute respiratory distress.  Cardiovascular: Regular rate and rhythm.  Good overall  perfusion.  Upper and lower extremity pulses are equal.  GI: Abdomen soft, non-tender to palpation.  No guarding or rebound. Bowel sounds present throughout.   Back: No CVA tenderness.    Musculoskeletal: Moves all 4 extremities equally, full function and capacity no peripheral edema.  Full function  Integument: Warm, dry. No rash. No bruising or petechiae.  Neurologic: He is sedated but is responsive and follows commands.  He is steady on his feet and moves all extremities  Psychiatric: Normal mood and affect.  Appropriate judgement.    ED COURSE:  12:52 PM I met with the patient, obtained history, performed an initial exam, and discussed options and plan for diagnostics and treatment here in the ED.   1:25 AM The patient eloped.        Medical Decision Making    History:  Supplemental history from: N/A  External Record(s) reviewed: External medical records including care everywhere reviewed    Work Up:  EKG, laboratory and imaging studies as ordered were independently interpreted by myself.   Broad differential diagnosis considered for altered mental status  The patient's presentation was of moderate complexity.       Complicating factors:  Patient has a complicated past medical history including Opioid use disorder  Care affected by social determinants of health: Access to primary care            MEDICATIONS GIVEN IN THE EMERGENCY:  Medications - No data to display        NEW PRESCRIPTIONS STARTED AT TODAY'S ER VISIT:  New Prescriptions    No medications on file                FINAL DIAGNOSIS:    ICD-10-CM    1. Opioid abuse with intoxication, unspecified (H)  F11.129                  NAME: Umesh Walters  AGE: 34 year old male  YOB: 1990  MRN: [id number]  EVALUATION DATE & TIME: 10/23/2024 12:36 PM    PCP: No Ref-Primary, Physician    ED PROVIDER: Curits Washington M.D.      Roosevelt GAMBLE, am serving as a scribe to document services personally performed by Dr. Curtis Washington based on my observation and the  provider's statements to me. I, Curtis Washington MD attest that Rooseveltadam Miguel is acting in a scribe capacity, has observed my performance of the services and has documented them in accordance with my direction.    Curtis Washington M.D.  Emergency Medicine  [facility] EMERGENCY DEPARTMENT  [facility] [facility] [id number]  [phone]  Dept: [phone]  10/23/2024         Curtis Washington MD  10/23/24 [id number]

## 2024-10-23 NOTE — ED NOTES
Patient placed on montior, was asleep awakened. Requesting to go to bathroom, steady on feet. On return to bathroom, wanting to leave. Did not wait for provider, left dept, steady on feet. Dr Washington updated

## (undated) RX ORDER — LIDOCAINE HYDROCHLORIDE 10 MG/ML
INJECTION, SOLUTION EPIDURAL; INFILTRATION; INTRACAUDAL; PERINEURAL
Status: DISPENSED
Start: 2019-12-27

## (undated) RX ORDER — CEFAZOLIN SODIUM 2 G/100ML
INJECTION, SOLUTION INTRAVENOUS
Status: DISPENSED
Start: 2019-11-12

## (undated) RX ORDER — HEPARIN SODIUM 1000 [USP'U]/ML
INJECTION, SOLUTION INTRAVENOUS; SUBCUTANEOUS
Status: DISPENSED
Start: 2019-11-12

## (undated) RX ORDER — LIDOCAINE HYDROCHLORIDE 10 MG/ML
INJECTION, SOLUTION EPIDURAL; INFILTRATION; INTRACAUDAL; PERINEURAL
Status: DISPENSED
Start: 2019-11-12

## (undated) RX ORDER — FENTANYL CITRATE 50 UG/ML
INJECTION, SOLUTION INTRAMUSCULAR; INTRAVENOUS
Status: DISPENSED
Start: 2019-11-12